# Patient Record
Sex: FEMALE | Race: BLACK OR AFRICAN AMERICAN | Employment: FULL TIME | URBAN - METROPOLITAN AREA
[De-identification: names, ages, dates, MRNs, and addresses within clinical notes are randomized per-mention and may not be internally consistent; named-entity substitution may affect disease eponyms.]

---

## 2019-02-25 LAB
ANTIBODY SCREEN, EXTERNAL: NEGATIVE
CHLAMYDIA, EXTERNAL: NEGATIVE
HBSAG, EXTERNAL: NEGATIVE
HIV, EXTERNAL: NORMAL
N. GONORRHEA, EXTERNAL: NEGATIVE
RPR, EXTERNAL: NORMAL
RUBELLA, EXTERNAL: NORMAL
TYPE, ABO & RH, EXTERNAL: NORMAL

## 2019-08-26 LAB — GRBS, EXTERNAL: NEGATIVE

## 2019-09-17 ENCOUNTER — ANESTHESIA EVENT (OUTPATIENT)
Dept: LABOR AND DELIVERY | Age: 29
DRG: 560 | End: 2019-09-17
Payer: MEDICAID

## 2019-09-17 ENCOUNTER — ANESTHESIA (OUTPATIENT)
Dept: LABOR AND DELIVERY | Age: 29
DRG: 560 | End: 2019-09-17
Payer: MEDICAID

## 2019-09-17 ENCOUNTER — HOSPITAL ENCOUNTER (INPATIENT)
Age: 29
LOS: 2 days | Discharge: HOME | DRG: 560 | End: 2019-09-19
Attending: OBSTETRICS & GYNECOLOGY | Admitting: OBSTETRICS & GYNECOLOGY
Payer: MEDICAID

## 2019-09-17 PROBLEM — Z34.90 PREGNANCY: Status: ACTIVE | Noted: 2019-09-17

## 2019-09-17 LAB
ERYTHROCYTE [DISTWIDTH] IN BLOOD BY AUTOMATED COUNT: 15.3 % (ref 11.5–14.5)
HCT VFR BLD AUTO: 33.3 % (ref 35–47)
HGB BLD-MCNC: 10.7 G/DL (ref 11.5–16)
MCH RBC QN AUTO: 26.2 PG (ref 26–34)
MCHC RBC AUTO-ENTMCNC: 32.1 G/DL (ref 30–36.5)
MCV RBC AUTO: 81.6 FL (ref 80–99)
NRBC # BLD: 0 K/UL (ref 0–0.01)
NRBC BLD-RTO: 0 PER 100 WBC
PLATELET # BLD AUTO: 312 K/UL (ref 150–400)
PMV BLD AUTO: 9.9 FL (ref 8.9–12.9)
RBC # BLD AUTO: 4.08 M/UL (ref 3.8–5.2)
WBC # BLD AUTO: 6.6 K/UL (ref 3.6–11)

## 2019-09-17 PROCEDURE — 74011000250 HC RX REV CODE- 250: Performed by: ANESTHESIOLOGY

## 2019-09-17 PROCEDURE — 36415 COLL VENOUS BLD VENIPUNCTURE: CPT

## 2019-09-17 PROCEDURE — 75410000002 HC LABOR FEE PER 1 HR: Performed by: OBSTETRICS & GYNECOLOGY

## 2019-09-17 PROCEDURE — 3E0R3BZ INTRODUCTION OF ANESTHETIC AGENT INTO SPINAL CANAL, PERCUTANEOUS APPROACH: ICD-10-PCS | Performed by: ANESTHESIOLOGY

## 2019-09-17 PROCEDURE — 3E033VJ INTRODUCTION OF OTHER HORMONE INTO PERIPHERAL VEIN, PERCUTANEOUS APPROACH: ICD-10-PCS | Performed by: OBSTETRICS & GYNECOLOGY

## 2019-09-17 PROCEDURE — 76815 OB US LIMITED FETUS(S): CPT | Performed by: OBSTETRICS & GYNECOLOGY

## 2019-09-17 PROCEDURE — 0HQ9XZZ REPAIR PERINEUM SKIN, EXTERNAL APPROACH: ICD-10-PCS | Performed by: OBSTETRICS & GYNECOLOGY

## 2019-09-17 PROCEDURE — 75410000000 HC DELIVERY VAGINAL/SINGLE: Performed by: OBSTETRICS & GYNECOLOGY

## 2019-09-17 PROCEDURE — 74011250636 HC RX REV CODE- 250/636: Performed by: OBSTETRICS & GYNECOLOGY

## 2019-09-17 PROCEDURE — 4A1HXCZ MONITORING OF PRODUCTS OF CONCEPTION, CARDIAC RATE, EXTERNAL APPROACH: ICD-10-PCS | Performed by: OBSTETRICS & GYNECOLOGY

## 2019-09-17 PROCEDURE — 74011250637 HC RX REV CODE- 250/637: Performed by: OBSTETRICS & GYNECOLOGY

## 2019-09-17 PROCEDURE — 10907ZC DRAINAGE OF AMNIOTIC FLUID, THERAPEUTIC FROM PRODUCTS OF CONCEPTION, VIA NATURAL OR ARTIFICIAL OPENING: ICD-10-PCS | Performed by: OBSTETRICS & GYNECOLOGY

## 2019-09-17 PROCEDURE — 00HU33Z INSERTION OF INFUSION DEVICE INTO SPINAL CANAL, PERCUTANEOUS APPROACH: ICD-10-PCS | Performed by: ANESTHESIOLOGY

## 2019-09-17 PROCEDURE — 77030010848 HC CATH INTUTR PRSS KOLB -B

## 2019-09-17 PROCEDURE — 65270000029 HC RM PRIVATE

## 2019-09-17 PROCEDURE — 75410000003 HC RECOV DEL/VAG/CSECN EA 0.5 HR: Performed by: OBSTETRICS & GYNECOLOGY

## 2019-09-17 PROCEDURE — 85027 COMPLETE CBC AUTOMATED: CPT

## 2019-09-17 PROCEDURE — 76060000078 HC EPIDURAL ANESTHESIA: Performed by: ANESTHESIOLOGY

## 2019-09-17 RX ORDER — SODIUM CHLORIDE 0.9 % (FLUSH) 0.9 %
5-40 SYRINGE (ML) INJECTION EVERY 8 HOURS
Status: DISCONTINUED | OUTPATIENT
Start: 2019-09-17 | End: 2019-09-19 | Stop reason: HOSPADM

## 2019-09-17 RX ORDER — MAG HYDROX/ALUMINUM HYD/SIMETH 200-200-20
30 SUSPENSION, ORAL (FINAL DOSE FORM) ORAL
Status: DISCONTINUED | OUTPATIENT
Start: 2019-09-17 | End: 2019-09-19 | Stop reason: HOSPADM

## 2019-09-17 RX ORDER — NALOXONE HYDROCHLORIDE 0.4 MG/ML
0.4 INJECTION, SOLUTION INTRAMUSCULAR; INTRAVENOUS; SUBCUTANEOUS AS NEEDED
Status: DISCONTINUED | OUTPATIENT
Start: 2019-09-17 | End: 2019-09-19 | Stop reason: HOSPADM

## 2019-09-17 RX ORDER — ONDANSETRON 4 MG/1
4 TABLET, ORALLY DISINTEGRATING ORAL
Status: ACTIVE | OUTPATIENT
Start: 2019-09-17 | End: 2019-09-18

## 2019-09-17 RX ORDER — NALBUPHINE HYDROCHLORIDE 10 MG/ML
10 INJECTION, SOLUTION INTRAMUSCULAR; INTRAVENOUS; SUBCUTANEOUS
Status: DISCONTINUED | OUTPATIENT
Start: 2019-09-17 | End: 2019-09-19 | Stop reason: HOSPADM

## 2019-09-17 RX ORDER — SODIUM CHLORIDE, SODIUM LACTATE, POTASSIUM CHLORIDE, CALCIUM CHLORIDE 600; 310; 30; 20 MG/100ML; MG/100ML; MG/100ML; MG/100ML
125 INJECTION, SOLUTION INTRAVENOUS CONTINUOUS
Status: DISCONTINUED | OUTPATIENT
Start: 2019-09-17 | End: 2019-09-18

## 2019-09-17 RX ORDER — OXYTOCIN/0.9 % SODIUM CHLORIDE 30/500 ML
0-25 PLASTIC BAG, INJECTION (ML) INTRAVENOUS
Status: DISCONTINUED | OUTPATIENT
Start: 2019-09-17 | End: 2019-09-18

## 2019-09-17 RX ORDER — IBUPROFEN 800 MG/1
800 TABLET ORAL EVERY 8 HOURS
Status: DISCONTINUED | OUTPATIENT
Start: 2019-09-17 | End: 2019-09-19 | Stop reason: HOSPADM

## 2019-09-17 RX ORDER — OXYCODONE AND ACETAMINOPHEN 5; 325 MG/1; MG/1
1 TABLET ORAL
Status: DISCONTINUED | OUTPATIENT
Start: 2019-09-17 | End: 2019-09-19 | Stop reason: HOSPADM

## 2019-09-17 RX ORDER — ACETAMINOPHEN 325 MG/1
650 TABLET ORAL
Status: DISCONTINUED | OUTPATIENT
Start: 2019-09-17 | End: 2019-09-19 | Stop reason: HOSPADM

## 2019-09-17 RX ORDER — ZOLPIDEM TARTRATE 5 MG/1
5 TABLET ORAL
Status: DISCONTINUED | OUTPATIENT
Start: 2019-09-17 | End: 2019-09-19 | Stop reason: HOSPADM

## 2019-09-17 RX ORDER — SODIUM CHLORIDE 0.9 % (FLUSH) 0.9 %
5-40 SYRINGE (ML) INJECTION AS NEEDED
Status: DISCONTINUED | OUTPATIENT
Start: 2019-09-17 | End: 2019-09-19 | Stop reason: HOSPADM

## 2019-09-17 RX ORDER — SODIUM CHLORIDE 9 MG/ML
125 INJECTION, SOLUTION INTRAVENOUS CONTINUOUS
Status: DISCONTINUED | OUTPATIENT
Start: 2019-09-17 | End: 2019-09-19 | Stop reason: HOSPADM

## 2019-09-17 RX ORDER — FENTANYL/BUPIVACAINE/NS/PF 2-1250MCG
12 PREFILLED PUMP RESERVOIR EPIDURAL CONTINUOUS
Status: DISCONTINUED | OUTPATIENT
Start: 2019-09-17 | End: 2019-09-18

## 2019-09-17 RX ORDER — BUPIVACAINE HYDROCHLORIDE 5 MG/ML
INJECTION, SOLUTION EPIDURAL; INTRACAUDAL AS NEEDED
Status: DISCONTINUED | OUTPATIENT
Start: 2019-09-17 | End: 2019-09-24 | Stop reason: HOSPADM

## 2019-09-17 RX ORDER — ONDANSETRON 2 MG/ML
4 INJECTION INTRAMUSCULAR; INTRAVENOUS
Status: DISCONTINUED | OUTPATIENT
Start: 2019-09-17 | End: 2019-09-19 | Stop reason: HOSPADM

## 2019-09-17 RX ORDER — OXYTOCIN/RINGER'S LACTATE 20/1000 ML
125-500 PLASTIC BAG, INJECTION (ML) INTRAVENOUS ONCE
Status: DISPENSED | OUTPATIENT
Start: 2019-09-17 | End: 2019-09-18

## 2019-09-17 RX ORDER — HYDROCORTISONE ACETATE PRAMOXINE HCL 2.5; 1 G/100G; G/100G
CREAM TOPICAL AS NEEDED
Status: DISCONTINUED | OUTPATIENT
Start: 2019-09-17 | End: 2019-09-19 | Stop reason: HOSPADM

## 2019-09-17 RX ADMIN — Medication 10 ML/HR: at 13:10

## 2019-09-17 RX ADMIN — SODIUM CHLORIDE, SODIUM LACTATE, POTASSIUM CHLORIDE, AND CALCIUM CHLORIDE 125 ML/HR: 600; 310; 30; 20 INJECTION, SOLUTION INTRAVENOUS at 07:18

## 2019-09-17 RX ADMIN — OXYTOCIN-SODIUM CHLORIDE 0.9% IV SOLN 30 UNIT/500ML 2 MILLI-UNITS/MIN: 30-0.9/5 SOLUTION at 08:50

## 2019-09-17 RX ADMIN — BUPIVACAINE HYDROCHLORIDE 5 ML: 5 INJECTION, SOLUTION EPIDURAL; INTRACAUDAL; PERINEURAL at 15:20

## 2019-09-17 RX ADMIN — SODIUM CHLORIDE 1000 ML: 900 INJECTION, SOLUTION INTRAVENOUS at 14:23

## 2019-09-17 RX ADMIN — SODIUM CHLORIDE 999 ML/HR: 900 INJECTION, SOLUTION INTRAVENOUS at 14:59

## 2019-09-17 RX ADMIN — SODIUM CHLORIDE, SODIUM LACTATE, POTASSIUM CHLORIDE, AND CALCIUM CHLORIDE 999 ML/HR: 600; 310; 30; 20 INJECTION, SOLUTION INTRAVENOUS at 08:45

## 2019-09-17 RX ADMIN — SODIUM CHLORIDE, SODIUM LACTATE, POTASSIUM CHLORIDE, AND CALCIUM CHLORIDE 125 ML/HR: 600; 310; 30; 20 INJECTION, SOLUTION INTRAVENOUS at 09:30

## 2019-09-17 RX ADMIN — BUPIVACAINE HYDROCHLORIDE 8 ML: 2.5 INJECTION, SOLUTION EPIDURAL; INFILTRATION; INTRACAUDAL; PERINEURAL at 15:15

## 2019-09-17 RX ADMIN — LIDOCAINE HYDROCHLORIDE AND EPINEPHRINE 2 ML: 15; 5 INJECTION, SOLUTION EPIDURAL at 12:55

## 2019-09-17 RX ADMIN — BUPIVACAINE HYDROCHLORIDE 8 ML: 2.5 INJECTION, SOLUTION EPIDURAL; INFILTRATION; INTRACAUDAL; PERINEURAL at 18:36

## 2019-09-17 RX ADMIN — IBUPROFEN 800 MG: 800 TABLET ORAL at 21:58

## 2019-09-17 RX ADMIN — SODIUM CHLORIDE 125 ML/HR: 900 INJECTION, SOLUTION INTRAVENOUS at 18:35

## 2019-09-17 RX ADMIN — ACETAMINOPHEN 650 MG: 325 TABLET ORAL at 23:23

## 2019-09-17 RX ADMIN — SODIUM CHLORIDE, SODIUM LACTATE, POTASSIUM CHLORIDE, AND CALCIUM CHLORIDE 125 ML/HR: 600; 310; 30; 20 INJECTION, SOLUTION INTRAVENOUS at 18:35

## 2019-09-17 RX ADMIN — LIDOCAINE HYDROCHLORIDE AND EPINEPHRINE 3 ML: 15; 5 INJECTION, SOLUTION EPIDURAL at 12:50

## 2019-09-17 NOTE — ANESTHESIA PREPROCEDURE EVALUATION
Relevant Problems   No relevant active problems       Anesthetic History   No history of anesthetic complications            Review of Systems / Medical History  Patient summary reviewed and pertinent labs reviewed    Pulmonary                   Neuro/Psych   Within defined limits           Cardiovascular  Within defined limits                Exercise tolerance: >4 METS     GI/Hepatic/Renal     GERD           Endo/Other        Morbid obesity     Other Findings   Comments: , medical IOL for [facility]           Physical Exam    Airway  Mallampati: II  TM Distance: 4 - 6 cm  Neck ROM: normal range of motion   Mouth opening: Normal     Cardiovascular  Regular rate and rhythm,  S1 and S2 normal,  no murmur, click, rub, or gallop  Rhythm: regular  Rate: normal         Dental  No notable dental hx    Comments: Chipped right upper premolar   Pulmonary                 Abdominal         Other Findings            Anesthetic Plan    ASA: 3  Anesthesia type: epidural            Anesthetic plan and risks discussed with: Patient

## 2019-09-17 NOTE — PROGRESS NOTES
arrived to L&D for scheduled induction, pt states she was breech but was vertex yesterday so an induction was scheduled instead of a c/s, denies any issues this pregnancy, placed on monitor & consents signed

## 2019-09-17 NOTE — L&D DELIVERY NOTE
Delivery Summary    Patient: Mohsen Quesada MRN: [id number]  SSN: [id number]    YOB: 1990  Age: 26 y.o. Sex: female        Patient progressed to C/C/+1. Pushed for approximately  10 minutes before delivery of fetal head in OA position. Gentle downward traction of fetal head with easy delivery of shoulders and remainder of body. Nuchal cord reduced. Infant placed on mothers abdomen. Placenta delivered intact in 10 minutes. Perineum inspected and noted 1st degree laceration that was repaired with 3-0 vircyl in a figure of eight. Information for the patient's :  Meghan Marti [[id number]]       Labor Events:    Labor: No    Steroids: None   Cervical Ripening Date/Time:       Cervical Ripening Type: None   Antibiotics During Labor: No   Rupture Identifier:      Rupture Date/Time: 2019 8:36 AM   Rupture Type: AROM   Amniotic Fluid Volume: Moderate    Amniotic Fluid Description: Clear    Amniotic Fluid Odor: None    Induction: Oxytocin;AROM       Induction Date/Time:        Indications for Induction: Hypertension    Augmentation: Oxytocin;AROM   Augmentation Date/Time:      Indications for Augmentation:     Labor complications:          Additional complications:        Delivery Events:  Indications For Episiotomy:     Episiotomy:     Perineal Laceration(s):     Repaired:     Periurethral Laceration Location:      Repaired:     Labial Laceration Location:     Repaired:     Sulcal Laceration Location:     Repaired:     Vaginal Laceration Location:     Repaired:     Cervical Laceration Location:     Repaired:     Repair Suture:     Number of Repair Packets:     Estimated Blood Loss (ml):  ml     Delivery Date: 2019    Delivery Time: 7:03 PM  Delivery Type: Vaginal, Spontaneous  Sex:  Female    Gestational Age: 36w3d   Delivery Clinician:  Aura Harper  Living Status: Living   Delivery Location: L&D            APGARS  One minute Five minutes Ten minutes Skin color: 1   1        Heart rate: 2   2        Grimace: 2   2        Muscle tone: 2   2        Breathin   2        Totals: 9   9            Presentation: Vertex    Position:        Resuscitation Method:  Suctioning-bulb; Tactile Stimulation     Meconium Stained: None      Cord Information: 3 Vessels  Complications: None  Cord around:    Delayed cord clamping? Yes  Cord clamped date/time:2019  7:04 PM  Disposition of Cord Blood: Lab    Blood Gases Sent?: No    Placenta:  Date/Time:    Removal:        Appearance:        Measurements:  Birth Weight:        Birth Length:        Head Circumference:        Chest Circumference:       Abdominal Girth: Other Providers:   Rony HERRERA;MICHAEL MOON;;;;;;;;ANMOL YEAGER;Zurdo SCHERER, Obstetrician;Primary Nurse;Primary [place] Nurse;Nicu Nurse;Neonatologist;Anesthesiologist;Crna;Nurse Practitioner;Midwife;Nursery Nurse;           Group B Strep:   Lab Results   Component Value Date/Time    GrBStrep, External Negative 2019     Information for the patient's :  Jorge Short [[id number]]   No results found for: ABORH, PCTABR, PCTDIG, BILI, ABORHEXT, ABORH    No results for input(s): PCO2CB, PO2CB, HCO3I, SO2I, IBD, PTEMPI, SPECTI, PHICB, ISITE, IDEV, IALLEN in the last 72 hours.

## 2019-09-17 NOTE — H&P
History and Physical    Patient: Himanshu Pereira MRN: [id number]  SSN: [id number]    YOB: 1990  Age: 26 y.o. Sex: female      Subjective:      Himanshu Pereira is a 26 y.o.  at 36.2 admitted for induction of labor for chronic hypertension. Patient is doing well this am and denies any contractions, loss of fluid or vaginal bleeding. Active fetal movement. Pregnancy Hx:  CHTN- no meds currently   Morbid obesity  LGA: EFW 98%tile, ~4400g on    O+/ GBS negative     History reviewed. No pertinent past medical history. History reviewed. No pertinent surgical history. History reviewed. No pertinent family history. Social History     Tobacco Use    Smoking status: Never Smoker    Smokeless tobacco: Never Used   Substance Use Topics    Alcohol use: No      Prior to Admission medications    Medication Sig Start Date End Date Taking? Authorizing Provider   [id number] araseli-folic-dha (PRENATAL DHA+COMPLETE PRENATAL) [id number] mg-mcg-mg cmpk Take 1 Cap by mouth. Indications: pregnancy   Yes Provider, Historical        No Known Allergies    Review of Systems:  A comprehensive review of systems was negative except for that written in the History of Present Illness.     Objective:     Vitals:    19 0627 19 0700   BP:  144/89   Pulse:  (!) 102   Resp:  14   Temp:  98.3 °F (36.8 °C)   Weight: 145.2 kg (320 lb)    Height: 5' 6\" (1.676 m)         Physical Exam:  Gen: alert and oriented X3   Resp:nonlabored respirations  Cardiac: normal peripheral perfusion  Abdomen: Gravid   SVE: 3/50/-3 , AROM performed   Vertex on US    130's/mod/+ accels/ no decels  No contractions     Recent Results (from the past 12 hour(s))   CBC W/O DIFF    Collection Time: 19  6:39 AM   Result Value Ref Range    WBC 6.6 3.6 - 11.0 K/uL    RBC 4.08 3.80 - 5.20 M/uL    HGB 10.7 (L) 11.5 - 16.0 g/dL    HCT 33.3 (L) 35.0 - 47.0 %    MCV 81.6 80.0 - 99.0 FL    MCH 26.2 26.0 - 34.0 PG    MCHC 32.1 30.0 - 36.5 g/dL    RDW 15.3 (H) 11.5 - 14.5 %    PLATELET 570 559 - 629 K/uL    MPV 9.9 8.9 - 12.9 FL    NRBC 0.0 0  WBC    ABSOLUTE NRBC 0.00 0.00 - 0.01 K/uL         Hospital Problems  Never Reviewed          Codes Class Noted POA    Pregnancy ICD-10-CM: Z34.90  ICD-9-CM: V22.2  2019 Unknown              Plan:   27 yo  at 39.1 admitted for IOL for [facility] currently off medication ( briefly on earlier in pregnancy).    Start IOL with pitocin and AROM  LGA fetus: ~4400g yesterday on US, EFW 98%tile    O+/GBS neg         Signed By: Clarita Knight MD     2019

## 2019-09-17 NOTE — PROGRESS NOTES
0700-Bedside report received from Alexandre Jensen RN. POC discussed with pt at this time, she verbalized understanding and has no questions or concerns at this time. 0833-Tarsha Barrios at bedside to see pt, discuss POC, perform ultrasound, and SVE. 0834-Ultrasound performed, infant still vertex at this time. 0836-SVE performed, cervix is 3/50/-3, pt AROM'd and clear fluid noted. 1313-Dr. Haskins at bedside to perform SVE, and place internal monitors. 1411-Dr. Haskins called and report pt still having variable decelerations after changing positions. New order received to start amnio infusion. 1423-Amnio Infusion started at this time. 1444-Keo Gregory called and requested re-dose for pt at this time. MD in OR and will come evaluate pt when she is out. 1511-Dr. Jenae Villalobos at bedside to re-dose epidural.  1811-Pt repositioned to left lateral.  1819-Infant having decelerations after repositioning pt. Pt now repositioned back to right lateral with birthing ball at this time. 1826-Dr. Lesli Caban called and asked to re-dose pt. 1834-Bassam Monroe at bedside to re-dose pt.  1841-Dr. Haskins at bedside to recheck pt, pt 10cm and to be prepared for delivery. 1845-Grimes catheter removed, 550cc of urine noted. 1900-Bedside report given to SIMI Trujillo RN.

## 2019-09-17 NOTE — PROGRESS NOTES
- Received report from ALTAGRACIA Painter RN. 1903  of [facility] by MD Haskins    2130 Pt resting comfortably in bed, no complaints. Infant, partner, and mother at bedside. Delivery  mL, recovery  mL    2245 Pt  Ambulated to bathroom, voided 400 ml. Tolerated well.     1106 Report given to [facility]

## 2019-09-17 NOTE — PROGRESS NOTES
Patient resting comfortably with epidural.     Visit Vitals  /89   Pulse (!) 102   Temp 98.3 °F (36.8 °C)   Resp 14   Ht 5' 6\" (1.676 m)   Wt 145.2 kg (320 lb)   BMI 51.65 kg/m²     Gen: alert and oriented X3   Resp:nonlabored respirations  Cardiac: normal peripheral perfusion  Abdomen: Gravid   SVE: 4/70/-3    135/mod/ + accel/ variable decels ( resolve with position changes)     25 yo  at 39.1 admitted for IOL for [facility] currently off medication ( briefly on earlier in pregnancy). AROM this am with clear fluid    Pitocin on 10U, IUPC placed to aid in titration.  Contractions difficult to trace due to habitus   FSE placed for fetal heart rate monitoring    Repeat exam in 2- 4 hrs or PRN as indicated     LGA fetus: ~4400g yesterday on US, EFW 98%tile    O+/GBS neg     Zarina Sam M.D.

## 2019-09-17 NOTE — PROGRESS NOTES
Resting comfortably with epidural.     Visit Vitals  /89   Pulse (!) 102   Temp 98.3 °F (36.8 °C)   Resp 14   Ht 5' 6\" (1.676 m)   Wt 145.2 kg (320 lb)   BMI 51.65 kg/m²        Gen: alert and oriented X3   Resp:nonlabored respirations  Cardiac: normal peripheral perfusion  Abdomen: Gravid   SVE: /-3     27 yo  at 39.1 admitted for IOL for [address] [facility] [address] currently off medication ( briefly on earlier in pregnancy). AROM this am with clear fluid    Continue Pitocin, IUPC placed at 1300 to titrate and monitor contractions.  Adequate since approximately 1445   has FSE  for fetal heart rate monitoring    Repeat exam in 2- 4 hrs or PRN as indicated      LGA fetus: ~4400g yesterday on US, EFW 98%tile    Maternal BMI : 51   O+/GBS neg      Zarina Sam M.D.

## 2019-09-18 LAB
ALBUMIN SERPL-MCNC: 2.1 G/DL (ref 3.5–5)
ALBUMIN/GLOB SERPL: 0.7 {RATIO} (ref 1.1–2.2)
ALP SERPL-CCNC: 79 U/L (ref 45–117)
ALT SERPL-CCNC: 15 U/L (ref 12–78)
ANION GAP SERPL CALC-SCNC: 5 MMOL/L (ref 5–15)
AST SERPL-CCNC: 18 U/L (ref 15–37)
BILIRUB SERPL-MCNC: 0.3 MG/DL (ref 0.2–1)
BUN SERPL-MCNC: 8 MG/DL (ref 6–20)
BUN/CREAT SERPL: 17 (ref 12–20)
CALCIUM SERPL-MCNC: 8.4 MG/DL (ref 8.5–10.1)
CHLORIDE SERPL-SCNC: 110 MMOL/L (ref 97–108)
CO2 SERPL-SCNC: 23 MMOL/L (ref 21–32)
CREAT SERPL-MCNC: 0.48 MG/DL (ref 0.55–1.02)
CREAT UR-MCNC: 100 MG/DL
ERYTHROCYTE [DISTWIDTH] IN BLOOD BY AUTOMATED COUNT: 15.4 % (ref 11.5–14.5)
GLOBULIN SER CALC-MCNC: 3.2 G/DL (ref 2–4)
GLUCOSE SERPL-MCNC: 71 MG/DL (ref 65–100)
HCT VFR BLD AUTO: 30.6 % (ref 35–47)
HGB BLD-MCNC: 9.9 G/DL (ref 11.5–16)
MCH RBC QN AUTO: 26.5 PG (ref 26–34)
MCHC RBC AUTO-ENTMCNC: 32.4 G/DL (ref 30–36.5)
MCV RBC AUTO: 81.8 FL (ref 80–99)
NRBC # BLD: 0 K/UL (ref 0–0.01)
NRBC BLD-RTO: 0 PER 100 WBC
PLATELET # BLD AUTO: 284 K/UL (ref 150–400)
PMV BLD AUTO: 10.3 FL (ref 8.9–12.9)
POTASSIUM SERPL-SCNC: 4.2 MMOL/L (ref 3.5–5.1)
PROT SERPL-MCNC: 5.3 G/DL (ref 6.4–8.2)
PROT UR-MCNC: 36 MG/DL (ref 0–11.9)
PROT/CREAT UR-RTO: 0.4
RBC # BLD AUTO: 3.74 M/UL (ref 3.8–5.2)
SODIUM SERPL-SCNC: 138 MMOL/L (ref 136–145)
WBC # BLD AUTO: 10.4 K/UL (ref 3.6–11)

## 2019-09-18 PROCEDURE — 84156 ASSAY OF PROTEIN URINE: CPT

## 2019-09-18 PROCEDURE — 85027 COMPLETE CBC AUTOMATED: CPT

## 2019-09-18 PROCEDURE — 36415 COLL VENOUS BLD VENIPUNCTURE: CPT

## 2019-09-18 PROCEDURE — 65270000029 HC RM PRIVATE

## 2019-09-18 PROCEDURE — 74011250637 HC RX REV CODE- 250/637: Performed by: OBSTETRICS & GYNECOLOGY

## 2019-09-18 PROCEDURE — 77030021125

## 2019-09-18 PROCEDURE — 80053 COMPREHEN METABOLIC PANEL: CPT

## 2019-09-18 RX ORDER — NIFEDIPINE 30 MG/1
30 TABLET, EXTENDED RELEASE ORAL DAILY
Status: CANCELLED | OUTPATIENT
Start: 2019-09-18

## 2019-09-18 RX ORDER — NIFEDIPINE 30 MG/1
30 TABLET, EXTENDED RELEASE ORAL DAILY
Status: DISCONTINUED | OUTPATIENT
Start: 2019-09-18 | End: 2019-09-19 | Stop reason: HOSPADM

## 2019-09-18 RX ADMIN — NIFEDIPINE 30 MG: 30 TABLET, FILM COATED, EXTENDED RELEASE ORAL at 21:00

## 2019-09-18 RX ADMIN — ACETAMINOPHEN 650 MG: 325 TABLET ORAL at 20:08

## 2019-09-18 RX ADMIN — IBUPROFEN 800 MG: 800 TABLET ORAL at 17:03

## 2019-09-18 RX ADMIN — ACETAMINOPHEN 650 MG: 325 TABLET ORAL at 04:35

## 2019-09-18 RX ADMIN — IBUPROFEN 800 MG: 800 TABLET ORAL at 08:15

## 2019-09-18 NOTE — PROGRESS NOTES
12 SBAR report from Payton Wilhelm, RN    4431 Morning assessment completed. SO at bedside. No needs expressed at this time. 1023 Patient sitting  on side of bed, eating breakfast. SO at bedside. No needs expressed at this time. 1215 Patient sitting in bed, holding baby. No needs expressed at this time. 1440 Patient walking in room. New bed pad provided. No other needs provided at this time. 1550 Patient in bed, sleeping. SO at bedside. 1710 Elevated BP. Patient reporting she needs to void and is in pain. Denies h/a, vision changed. Pain medicine given. Will re-check BP after patient voids. 36 Dr. Niya Bird notified of elevated BP. Per MD, re-check BP and call MD if still elevated. [address] [facility] [address]. Niya Bird updated on BP. No new orders a this time. Will continue to monitor on L&D.     1840 Patient resting in bed. Family at bedside. No needs expressed at this time. 0 SBAR report given to TUAN Ling Left message for patient to please call our office back.

## 2019-09-18 NOTE — PROGRESS NOTES
Bps elevated above baseline. Diagnosed with CHTN with elevated Bps <20weeks. Baseline labs wnl with Pr/Cr 0.2. Tried labetalol low dose for a few days but felt poorly. Intrapartum with rise in baseline. Today with 130-150s and few 589F systolic. [address] [facility] labs sent. If persistently >150s, will initiate nifedipine 30mg XR. Patient prefers to not take labetalol again.      Saud Wise MD

## 2019-09-18 NOTE — LACTATION NOTE
1410 - Rounding for lactation needs. Pt requests to defer consult until later in the afternoon. Pt encouraged to call [facility] when ready.

## 2019-09-18 NOTE — PROGRESS NOTES
Post-Partum Day Number 1 Progress Note    Sandy Argueta     Assessment: Doing well, post partum day 1, CHTN no medications    Plan:  1. Continue routine postpartum and perineal care as well as maternal education. 2. CHTN no medications- monitor BPs    Information for the patient's :  Yamileth Waters [[id number]]   Vaginal, Spontaneous   Patient doing well without significant complaint. Voiding without difficulty, normal lochia. Vitals:  Visit Vitals  /88 (BP 1 Location: Right arm, BP Patient Position: At rest;Sitting)   Pulse 94   Temp 98.1 °F (36.7 °C)   Resp 16   Ht 5' 6\" (1.676 m)   Wt 145.2 kg (320 lb)   SpO2 98%   Breastfeeding? Unknown   BMI 51.65 kg/m²     Temp (24hrs), Av.5 °F (36.9 °C), Min:98 °F (36.7 °C), Max:99.9 °F (37.7 °C)        Exam:   Patient without distress. Abdomen soft, fundus firm, nontender                Perineum with normal lochia noted. Lower extremities are negative for swelling, cords or tenderness. Labs:     Lab Results   Component Value Date/Time    WBC 6.6 2019 06:39 AM    WBC 3.4 (L) 2014 08:30 AM    HGB 10.7 (L) 2019 06:39 AM    HGB 12.1 2014 08:30 AM    HCT 33.3 (L) 2019 06:39 AM    HCT 36.1 2014 08:30 AM    PLATELET 123  06:39 AM    PLATELET 169  08:30 AM       No results found for this or any previous visit (from the past 24 hour(s)).

## 2019-09-19 VITALS
TEMPERATURE: 98.2 F | RESPIRATION RATE: 20 BRPM | BODY MASS INDEX: 47.09 KG/M2 | SYSTOLIC BLOOD PRESSURE: 147 MMHG | WEIGHT: 293 LBS | OXYGEN SATURATION: 98 % | HEIGHT: 66 IN | DIASTOLIC BLOOD PRESSURE: 93 MMHG | HEART RATE: 94 BPM

## 2019-09-19 PROCEDURE — 74011250637 HC RX REV CODE- 250/637: Performed by: OBSTETRICS & GYNECOLOGY

## 2019-09-19 RX ORDER — NIFEDIPINE 30 MG/1
30 TABLET, EXTENDED RELEASE ORAL DAILY
Qty: 30 TAB | Refills: 1 | Status: SHIPPED | OUTPATIENT
Start: 2019-09-20 | End: 2022-08-27

## 2019-09-19 RX ORDER — IBUPROFEN 800 MG/1
800 TABLET ORAL EVERY 8 HOURS
Qty: 30 TAB | Refills: 1 | Status: SHIPPED | OUTPATIENT
Start: 2019-09-19 | End: 2022-08-27

## 2019-09-19 RX ORDER — NIFEDIPINE 30 MG/1
30 TABLET, EXTENDED RELEASE ORAL
Status: DISCONTINUED | OUTPATIENT
Start: 2019-09-19 | End: 2019-09-19 | Stop reason: HOSPADM

## 2019-09-19 RX ADMIN — IBUPROFEN 800 MG: 800 TABLET ORAL at 10:23

## 2019-09-19 RX ADMIN — IBUPROFEN 800 MG: 800 TABLET ORAL at 02:44

## 2019-09-19 RX ADMIN — ACETAMINOPHEN 650 MG: 325 TABLET ORAL at 05:27

## 2019-09-19 RX ADMIN — NIFEDIPINE 30 MG: 30 TABLET, FILM COATED, EXTENDED RELEASE ORAL at 09:22

## 2019-09-19 NOTE — PROGRESS NOTES
Dr Givens Marrow on unit to see patient. New verbal order given for procardia xl 30mg po dose now. See md orders.

## 2019-09-19 NOTE — PROGRESS NOTES
Patient discharged to home with infant and significant other. Infant in carseat. Patient in wheelchair. Prescriptions given x 2 (procardia, ibuprofen). Discharge instructions reviewed with patient and significant other, signed by patient, and copies given. Per patient and significant other, no questions. Patient and infant bands verified, see infant note and/or footprint sheet on chart.

## 2019-09-19 NOTE — PROGRESS NOTES
2012: BP elevated. Dr Sandra Fernandez notified. Verbal order given to give procardia xr and reevaluate blood pressure 30 minutes after dose given. Will continue to monitor.

## 2019-09-19 NOTE — PROGRESS NOTES
Blood pressures taken at 1220 noted to 150/94, pulse 94 and at 1320 noted to be 147/93, pulse 94. Results called to Dr Ashutosh Gomez. Per Dr Ashutosh Gomez, discharge patient to home and have patient follow up on Monday or Tuesday of next week for blood pressure check.

## 2019-09-19 NOTE — LACTATION NOTE
This note was copied from a baby's chart. Mother and baby for discharge today. Mother states baby has been latching on well and breastfeeding well. She is an experienced breastfeeding mother. Reviewed breastfeeding basics:  Supply and demand, breastfeed baby 8-12 times in 24 hr., feed baby on demand,   stomach size, early  Feeding cues, skin to skin, positioning and baby led latch-on, assymetrical latch with signs of good, deep latch vs shallow, feeding frequency and duration, and log sheet for tracking infant feedings and output. Breastfeeding Booklet and Warm line information given. Discussed typical  weight loss and the importance of infant weight checks with pediatrician 1-2 post discharge. Engorgement Care Guidelines:  Reviewed how milk is made and normal phases of milk production. Taught care of engorged breasts - frequent breastfeeding encouraged, cool packs and motrin as tolerated. Anticipatory guidance shared. Care for sore/tender nipples discussed:  ways to improve positioning and latch practiced and discussed, hand express colostrum after feedings and let air dry, light application of lanolin, hydrogel pads, seek comfortable laid back feeding position, start feedings on least sore side first.    Discussed eating a healthy diet. Instructed mother to eat a variety of foods in order to get a well balanced diet. She should consume an extra 500 calories per day (more than her non-pregnant requirement.) These extra calories will help provide energy needed for optimal breast milk production. Mother also encouraged to \"drink to thirst\" and it is recommended that she drink fluids such as water, fruit/vegetable juice. Nutritious snacks should be available so that she can eat throughout the day to help satisfy her hunger and maintain a good milk supply.     Pt will successfully establish breastfeeding by feeding in response to early feeding cues   or wake every 3h, will obtain deep latch, and will keep log of feedings/output. Taught to BF at hunger cues and or q 2-3 hrs and to offer 10-20 drops of hand expressed colostrum at any non-feeds. Breast Assessment  Left Breast: Large, Extra large  Left Nipple: Everted, Intact, Short  Right Breast: Large, Extra large  Right Nipple: Everted, Intact, Short  Breast- Feeding Assessment  Attends Breast-Feeding Classes: No  Breast-Feeding Experience: Yes(Breast fed 1st baby x 9 months and pumped one additional month)  Breast Trauma/Surgery: No  Type/Quality: Good  Lactation Consultant Visits  Breast-Feedings: Good   Mother/Infant Observation  Mother Observation: Alignment, Recognizes feeding cues, Breast comfortable, Holds breast, Close hold, Lets baby end feeding, Cramps  Infant Observation: Audible swallows, Lips flanged, lower, Lips flanged, upper, Feeding cues, Opens mouth, Latches nipple and aereolae, Rhythmic suck  LATCH Documentation  Latch: Grasps breast, tongue down, lips flanged, rhythmic sucking  Audible Swallowing: A few with stimulation  Type of Nipple: Everted (after stimulation)  Comfort (Breast/Nipple): Soft/non-tender  Hold (Positioning): No assist from staff, mother able to position/hold infant  LATCH Score: 9      Chart shows numerous feedings, void, stool WNL. Discussed importance of monitoring outputs and feedings on first week of life. Discussed ways to tell if baby is  getting enough breast milk, ie  voids and stools, change in color of stool, and return to birth wt within 2 weeks. Follow up with pediatrician visit for weight check in 1-2 days (per AAP guidelines.)  Encouraged to call Warm Line  [phone]  for any questions/problems that arise.  Mother also given breastfeeding support group dates and times for any future needs

## 2019-09-19 NOTE — ROUTINE PROCESS
Bedside and Verbal shift change report given to HELLEN Holcomb RN (oncoming nurse) by [employer]. Vivian Garcia RN (offgoing nurse). Report included the following information SBAR, Kardex, Intake/Output, MAR, Accordion and Recent Results.

## 2019-09-19 NOTE — PROGRESS NOTES
Post-Partum Day Number 2 Progress Note    [facility]     Assessment: Doing well, post partum day 2    Plan:   1. Discharge home today  2. Follow up in office in 1 week for BP check and in 6 weeks with Buzz Cruz MD  3. Post partum activity advised, diet as tolerated  4. Discharge Medications: ibuprofen, nifedipine and medications prior to admission    Information for the patient's :  Talha Sherman [[id number]]   Vaginal, Spontaneous   Patient doing well without significant complaint. Voiding without difficulty, normal lochia. Vitals:  Visit Vitals  /85 (BP 1 Location: Right arm, BP Patient Position: At rest)   Pulse 85   Temp 98.2 °F (36.8 °C)   Resp 20   Ht 5' 6\" (1.676 m)   Wt 145.2 kg (320 lb)   SpO2 98%   Breastfeeding? Unknown   BMI 51.65 kg/m²     Temp (24hrs), Av.1 °F (36.7 °C), Min:97.9 °F (36.6 °C), Max:98.2 °F (36.8 °C)      Exam:         Patient without distress. Abdomen soft, fundus firm, nontender                 Lower extremities are negative for swelling, cords or tenderness.     Labs:     Lab Results   Component Value Date/Time    WBC 10.4 2019 07:47 PM    WBC 6.6 2019 06:39 AM    WBC 3.4 (L) 2014 08:30 AM    HGB 9.9 (L) 2019 07:47 PM    HGB 10.7 (L) 2019 06:39 AM    HGB 12.1 2014 08:30 AM    HCT 30.6 (L) 2019 07:47 PM    HCT 33.3 (L) 2019 06:39 AM    HCT 36.1 2014 08:30 AM    PLATELET 528  07:47 PM    PLATELET 781  06:39 AM    PLATELET 025  08:30 AM       Recent Results (from the past 24 hour(s))   CBC W/O DIFF    Collection Time: 19  7:47 PM   Result Value Ref Range    WBC 10.4 3.6 - 11.0 K/uL    RBC 3.74 (L) 3.80 - 5.20 M/uL    HGB 9.9 (L) 11.5 - 16.0 g/dL    HCT 30.6 (L) 35.0 - 47.0 %    MCV 81.8 80.0 - 99.0 FL    MCH 26.5 26.0 - 34.0 PG    MCHC 32.4 30.0 - 36.5 g/dL    RDW 15.4 (H) 11.5 - 14.5 %    PLATELET 085 804 - 693 K/uL    MPV 10.3 8.9 - 12.9 FL NRBC 0.0 0  WBC    ABSOLUTE NRBC 0.00 0.00 - 0.24 K/uL   METABOLIC PANEL, COMPREHENSIVE    Collection Time: 09/18/19  7:47 PM   Result Value Ref Range    Sodium 138 136 - 145 mmol/L    Potassium 4.2 3.5 - 5.1 mmol/L    Chloride 110 (H) 97 - 108 mmol/L    CO2 23 21 - 32 mmol/L    Anion gap 5 5 - 15 mmol/L    Glucose 71 65 - 100 mg/dL    BUN 8 6 - 20 MG/DL    Creatinine 0.48 (L) 0.55 - 1.02 MG/DL    BUN/Creatinine ratio 17 12 - 20      GFR est AA >60 >60 ml/min/1.73m2    GFR est non-AA >60 >60 ml/min/1.73m2    Calcium 8.4 (L) 8.5 - 10.1 MG/DL    Bilirubin, total 0.3 0.2 - 1.0 MG/DL    ALT (SGPT) 15 12 - 78 U/L    AST (SGOT) 18 15 - 37 U/L    Alk. phosphatase 79 45 - 117 U/L    Protein, total 5.3 (L) 6.4 - 8.2 g/dL    Albumin 2.1 (L) 3.5 - 5.0 g/dL    Globulin 3.2 2.0 - 4.0 g/dL    A-G Ratio 0.7 (L) 1.1 - 2.2     PROTEIN/CREATININE RATIO, URINE    Collection Time: 09/18/19  8:28 PM   Result Value Ref Range    Protein, urine random 36 (H) 0.0 - 11.9 mg/dL    Creatinine, urine 100.00 mg/dL    Protein/Creat.  urine Ratio 0.4

## 2019-09-19 NOTE — DISCHARGE SUMMARY
Obstetrical Discharge Summary     Name: Domenic Jones MRN: [id number]  SSN: [id number]    YOB: 1990  Age: 26 y.o. Sex: female      Admit Date: 2019    Discharge Date: 2019     Admitting Physician: Rosalia Alvarez MD     Attending Physician:  Mason Wong MD     Admission Diagnoses: Pregnancy [Z34.90]    Discharge Diagnoses:   Information for the patient's :  Avi Arch [[id number]]   Delivery of a 3.94 kg female infant via Vaginal, Spontaneous on 2019 at 7:03 PM  by Rosalia Alvarez. Apgars were 9  and 9 . Additional Diagnoses:   Hospital Problems  Never Reviewed          Codes Class Noted POA    Pregnancy ICD-10-CM: Z34.90  ICD-9-CM: V22.2  2019 Unknown             Lab Results   Component Value Date/Time    Rubella, External Immune 2019    GrBStrep, External Negative 2019       Hospital Course: Postpartum course was complicated by hypertension, which responded well to oral nifedipine XR (patient had dizziness with labetalol.)      Disposition at Discharge: Home or self care    Discharged Condition: Stable    Patient Instructions:   Current Discharge Medication List      START taking these medications    Details   ibuprofen (MOTRIN) 800 mg tablet Take 1 Tab by mouth every eight (8) hours. Qty: 30 Tab, Refills: 1      NIFEdipine ER (PROCARDIA XL) 30 mg ER tablet Take 1 Tab by mouth daily. Qty: 30 Tab, Refills: 1         CONTINUE these medications which have NOT CHANGED    Details   [id number] araseli-folic-dha (PRENATAL DHA+COMPLETE PRENATAL) -300 mg-mcg-mg cmpk Take 1 Cap by mouth. Indications: pregnancy             Reference my discharge instructions. Follow-up Appointments   Procedures    FOLLOW UP VISIT Appointment in: One Week F/U in 1 week for BP check     F/U in 1 week for BP check     Standing Status:   Standing     Number of Occurrences:   1     Order Specific Question:   Appointment in     Answer:    One Week  FOLLOW UP VISIT Appointment in: 6 Weeks Post partum follow up with OB provider in 6 weeks. Post partum follow up with OB provider in 6 weeks.      Standing Status:   Standing     Number of Occurrences:   1     Standing Expiration Date:   9/20/2019     Order Specific Question:   Appointment in     Answer:   6 Weeks        Signed By:  Melba Dyson MD     September 19, 2019

## 2019-09-24 RX ORDER — BUPIVACAINE HYDROCHLORIDE 2.5 MG/ML
INJECTION, SOLUTION EPIDURAL; INFILTRATION; INTRACAUDAL AS NEEDED
Status: DISCONTINUED | OUTPATIENT
Start: 2019-09-17 | End: 2019-09-24 | Stop reason: HOSPADM

## 2019-09-24 RX ORDER — LIDOCAINE HYDROCHLORIDE AND EPINEPHRINE 15; 5 MG/ML; UG/ML
INJECTION, SOLUTION EPIDURAL AS NEEDED
Status: DISCONTINUED | OUTPATIENT
Start: 2019-09-17 | End: 2019-09-24 | Stop reason: HOSPADM

## 2019-09-24 NOTE — ANESTHESIA PROCEDURE NOTES
Epidural Block    Start time: 9/17/2019 12:46 PM  End time: 9/17/2019 1:00 PM  Performed by: Arianne Mckeon MD  Authorized by: Arianne Mckeon MD     Pre-Procedure  Indication: labor epidural    Preanesthetic Checklist: patient identified, risks and benefits discussed, anesthesia consent, patient being monitored, timeout performed and anesthesia consent    Timeout Time: 12:45        Epidural:   Patient position:  Seated  Prep region:  Lumbar  Prep: Betadine    Location:  L3-4    Needle and Epidural Catheter:   Needle Type:  Tuohy  Needle Gauge:  17 G  Injection Technique:  Loss of resistance using air  Attempts:  1  Catheter Size:  20 G  Catheter at Skin Depth (cm):  7  Depth in Epidural Space (cm):  4  Events: no blood with aspiration, no cerebrospinal fluid with aspiration, no paresthesia and negative aspiration test    Test Dose:  Negative    Assessment:   Catheter Secured:  Tegaderm and tape  Insertion:  Uncomplicated  Patient tolerance:  Patient tolerated the procedure well with no immediate complications  Late entry. Dr. Gladis Samuels called to STAT C/S while preparing to complete chart. Information added by Juan Johnson D.O. after conversation with Dr. Gladis Samuels.

## 2022-03-19 PROBLEM — Z34.90 PREGNANCY: Status: ACTIVE | Noted: 2019-09-17

## 2022-03-31 LAB
ANTIBODY SCREEN, EXTERNAL: NEGATIVE
CHLAMYDIA, EXTERNAL: NEGATIVE
HBSAG, EXTERNAL: NEGATIVE
HIV, EXTERNAL: NORMAL
N. GONORRHEA, EXTERNAL: NEGATIVE
RUBELLA, EXTERNAL: NORMAL
T. PALLIDUM, EXTERNAL: NORMAL

## 2022-06-30 LAB — ANTIBODY SCREEN, EXTERNAL: NEGATIVE

## 2022-08-17 LAB — GRBS, EXTERNAL: NEGATIVE

## 2022-08-24 ENCOUNTER — HOSPITAL ENCOUNTER (INPATIENT)
Age: 32
LOS: 3 days | Discharge: HOME | DRG: 560 | End: 2022-08-27
Attending: OBSTETRICS & GYNECOLOGY | Admitting: OBSTETRICS & GYNECOLOGY
Payer: MEDICAID

## 2022-08-24 PROBLEM — Z3A.37 37 WEEKS GESTATION OF PREGNANCY: Status: ACTIVE | Noted: 2022-08-24

## 2022-08-24 PROBLEM — Z34.90 TERM PREGNANCY: Status: ACTIVE | Noted: 2022-08-24

## 2022-08-24 PROBLEM — O13.9 GESTATIONAL HYPERTENSION: Status: ACTIVE | Noted: 2022-08-24

## 2022-08-24 LAB
ALBUMIN SERPL-MCNC: 2.3 G/DL (ref 3.5–5)
ALBUMIN/GLOB SERPL: 0.6 {RATIO} (ref 1.1–2.2)
ALP SERPL-CCNC: 71 U/L (ref 45–117)
ALT SERPL-CCNC: 14 U/L (ref 12–78)
ANION GAP SERPL CALC-SCNC: 11 MMOL/L (ref 5–15)
AST SERPL-CCNC: 21 U/L (ref 15–37)
BILIRUB SERPL-MCNC: 0.3 MG/DL (ref 0.2–1)
BUN SERPL-MCNC: 8 MG/DL (ref 6–20)
BUN/CREAT SERPL: 17 (ref 12–20)
CALCIUM SERPL-MCNC: 8.3 MG/DL (ref 8.5–10.1)
CHLORIDE SERPL-SCNC: 108 MMOL/L (ref 97–108)
CO2 SERPL-SCNC: 21 MMOL/L (ref 21–32)
CREAT SERPL-MCNC: 0.48 MG/DL (ref 0.55–1.02)
CREAT UR-MCNC: 144 MG/DL
ERYTHROCYTE [DISTWIDTH] IN BLOOD BY AUTOMATED COUNT: 15.9 % (ref 11.5–14.5)
GLOBULIN SER CALC-MCNC: 3.7 G/DL (ref 2–4)
GLUCOSE SERPL-MCNC: 97 MG/DL (ref 65–100)
HCT VFR BLD AUTO: 31.5 % (ref 35–47)
HGB BLD-MCNC: 10.2 G/DL (ref 11.5–16)
MCH RBC QN AUTO: 26.4 PG (ref 26–34)
MCHC RBC AUTO-ENTMCNC: 32.4 G/DL (ref 30–36.5)
MCV RBC AUTO: 81.6 FL (ref 80–99)
NRBC # BLD: 0 K/UL (ref 0–0.01)
NRBC BLD-RTO: 0 PER 100 WBC
PLATELET # BLD AUTO: 363 K/UL (ref 150–400)
PMV BLD AUTO: 10.5 FL (ref 8.9–12.9)
POTASSIUM SERPL-SCNC: 4.3 MMOL/L (ref 3.5–5.1)
PROT SERPL-MCNC: 6 G/DL (ref 6.4–8.2)
PROT UR-MCNC: 15 MG/DL (ref 0–11.9)
PROT/CREAT UR-RTO: 0.1
RBC # BLD AUTO: 3.86 M/UL (ref 3.8–5.2)
SODIUM SERPL-SCNC: 140 MMOL/L (ref 136–145)
WBC # BLD AUTO: 6.2 K/UL (ref 3.6–11)

## 2022-08-24 PROCEDURE — 85027 COMPLETE CBC AUTOMATED: CPT

## 2022-08-24 PROCEDURE — 65270000029 HC RM PRIVATE

## 2022-08-24 PROCEDURE — 74011250637 HC RX REV CODE- 250/637: Performed by: OBSTETRICS & GYNECOLOGY

## 2022-08-24 PROCEDURE — 59200 INSERT CERVICAL DILATOR: CPT | Performed by: MIDWIFE

## 2022-08-24 PROCEDURE — 36415 COLL VENOUS BLD VENIPUNCTURE: CPT

## 2022-08-24 PROCEDURE — 80053 COMPREHEN METABOLIC PANEL: CPT

## 2022-08-24 PROCEDURE — 4A1HXCZ MONITORING OF PRODUCTS OF CONCEPTION, CARDIAC RATE, EXTERNAL APPROACH: ICD-10-PCS | Performed by: OBSTETRICS & GYNECOLOGY

## 2022-08-24 PROCEDURE — 75410000002 HC LABOR FEE PER 1 HR: Performed by: MIDWIFE

## 2022-08-24 PROCEDURE — 84156 ASSAY OF PROTEIN URINE: CPT

## 2022-08-24 PROCEDURE — 3E0DXGC INTRODUCTION OF OTHER THERAPEUTIC SUBSTANCE INTO MOUTH AND PHARYNX, EXTERNAL APPROACH: ICD-10-PCS | Performed by: OBSTETRICS & GYNECOLOGY

## 2022-08-24 PROCEDURE — 74011250637 HC RX REV CODE- 250/637: Performed by: NURSE PRACTITIONER

## 2022-08-24 PROCEDURE — 3E0P7VZ INTRODUCTION OF HORMONE INTO FEMALE REPRODUCTIVE, VIA NATURAL OR ARTIFICIAL OPENING: ICD-10-PCS | Performed by: OBSTETRICS & GYNECOLOGY

## 2022-08-24 RX ORDER — ONDANSETRON 2 MG/ML
4 INJECTION INTRAMUSCULAR; INTRAVENOUS
Status: DISCONTINUED | OUTPATIENT
Start: 2022-08-24 | End: 2022-08-26

## 2022-08-24 RX ORDER — OXYTOCIN/RINGER'S LACTATE 30/500 ML
0-20 PLASTIC BAG, INJECTION (ML) INTRAVENOUS
Status: CANCELLED | OUTPATIENT
Start: 2022-08-24

## 2022-08-24 RX ORDER — OXYTOCIN/RINGER'S LACTATE 30/500 ML
0-20 PLASTIC BAG, INJECTION (ML) INTRAVENOUS
Status: DISCONTINUED | OUTPATIENT
Start: 2022-08-25 | End: 2022-08-26

## 2022-08-24 RX ORDER — SODIUM CHLORIDE 0.9 % (FLUSH) 0.9 %
5-40 SYRINGE (ML) INJECTION EVERY 8 HOURS
Status: DISCONTINUED | OUTPATIENT
Start: 2022-08-24 | End: 2022-08-26

## 2022-08-24 RX ORDER — SODIUM CHLORIDE 0.9 % (FLUSH) 0.9 %
5-40 SYRINGE (ML) INJECTION AS NEEDED
Status: DISCONTINUED | OUTPATIENT
Start: 2022-08-24 | End: 2022-08-26

## 2022-08-24 RX ORDER — MAG HYDROX/ALUMINUM HYD/SIMETH 200-200-20
30 SUSPENSION, ORAL (FINAL DOSE FORM) ORAL
Status: DISCONTINUED | OUTPATIENT
Start: 2022-08-24 | End: 2022-08-26

## 2022-08-24 RX ORDER — OXYTOCIN/RINGER'S LACTATE 30/500 ML
10 PLASTIC BAG, INJECTION (ML) INTRAVENOUS AS NEEDED
Status: DISCONTINUED | OUTPATIENT
Start: 2022-08-24 | End: 2022-08-24 | Stop reason: ALTCHOICE

## 2022-08-24 RX ORDER — NIFEDIPINE 30 MG/1
30 TABLET, EXTENDED RELEASE ORAL DAILY
Status: DISCONTINUED | OUTPATIENT
Start: 2022-08-25 | End: 2022-08-26

## 2022-08-24 RX ORDER — NALBUPHINE HYDROCHLORIDE 10 MG/ML
10 INJECTION, SOLUTION INTRAMUSCULAR; INTRAVENOUS; SUBCUTANEOUS
Status: DISCONTINUED | OUTPATIENT
Start: 2022-08-24 | End: 2022-08-26

## 2022-08-24 RX ORDER — GUAIFENESIN 100 MG/5ML
81 LIQUID (ML) ORAL DAILY
COMMUNITY
End: 2022-08-27

## 2022-08-24 RX ORDER — OXYTOCIN/RINGER'S LACTATE 30/500 ML
87.3 PLASTIC BAG, INJECTION (ML) INTRAVENOUS AS NEEDED
Status: DISCONTINUED | OUTPATIENT
Start: 2022-08-24 | End: 2022-08-24 | Stop reason: ALTCHOICE

## 2022-08-24 RX ADMIN — Medication 25 MCG: at 19:57

## 2022-08-24 RX ADMIN — Medication 25 MCG: at 22:03

## 2022-08-24 RX ADMIN — Medication 25 MCG: at 17:02

## 2022-08-24 NOTE — PROGRESS NOTES
1710: This RN notifying Dr. Louis Romero that fetal heart tones were obtained high on abdomen.  MD verbalizing understanding and stating that she will come perform ultrasound this evening to confirm vertex

## 2022-08-24 NOTE — PROGRESS NOTES
1900: Bedside and Verbal shift change report given to KEKE Tomlinson, RN (oncoming nurse) by [facility]. Jimmie Burns, KELLY (offgoing nurse). Report included the following information SBAR, Intake/Output, MAR, Recent Results, and Quality Measures. 1902: This RN at bedside. Patient states +FM, denies LOF or vaginal bleeding. Abdomen palpated, soft and non-tender. Patient educated to call nursing for any needs. 2030: Patient states she has not been taking ordered Nifedepine XL as BP levels have been WNL. Patient states Jae PAGE aware. This RN discussing patient medication regimen with Jem PARRA. VORB to hold Nifedepine unless needed. 2213: This RN at bedside adjusting FHR monitor. Fetal monitoring difficult due to maternal size. 0030: Patient resting quietly, denies needs at this time. 0700: Bedside and Verbal shift change report given to KIM Hinson (oncoming nurse) by Roya Rios RN (offgoing nurse). Report included the following information SBAR, Intake/Output, MAR, Recent Results, and Quality Measures.

## 2022-08-24 NOTE — PROGRESS NOTES
1615 - Patient here for induction for gestational hypertension. Patient ambulated to room and resting comfortably in bed. 1710 - Fetal heart tones found on upper abdomen. Dr. Melissa Milton notified and coming to do ultrasound. [id number] - Dr. Melissa Milton performed ultrasound. Baby is vertex. 18 [phone] Bryant Candelario called by this RN to request patient prenatal records. [phone] [facility] received. 1900 - Verbal shift change report given to Raza Jerez RN (oncoming nurse) by [facility], RN (offgoing nurse). Report included the following information SBAR, Kardex, Intake/Output, MAR, and Recent Results.

## 2022-08-24 NOTE — H&P
History & Physical    Name: Markel Feliz MRN: [id number]  SSN: [id number]    YOB: 1990  Age: 34 y.o. Sex: female      Subjective:     Estimated Date of Delivery: None noted. OB History    Para Term  AB Living   4 2 2   1 2   SAB IAB Ectopic Molar Multiple Live Births     1     0 1      # Outcome Date GA Lbr Michael/2nd Weight Sex Delivery Anes PTL Lv   4 Current            3 Term 19 39w1d 11:41 / 00:22 3.94 kg F Vag-Spont EPI N YFN   2 Term            1 IAB                Ms. Toñito Medina is admitted with pregnancy at 37 weeks and 1 day for induction of labor due to gestational HTN and BMI >40. Prenatal course was complicated by pregnancy induced hypertension and BMI 42 AT START OF CARE AND LATE ENTRY INTO PRENATAL CARE . Most recent pr eclamptic labs are normal  with ur pr/ Cr ration 0.12   Please see prenatal records for details. Past Medical History:   Diagnosis Date    Gestational hypertension 2022     No past surgical history on file. Social History     Occupational History    Not on file   Tobacco Use    Smoking status: Never    Smokeless tobacco: Never   Substance and Sexual Activity    Alcohol use: No    Drug use: No    Sexual activity: Yes     Partners: Male     Birth control/protection: None     No family history on file. No Known Allergies  Prior to Admission medications    Medication Sig Start Date End Date Taking? Authorizing Provider   ibuprofen (MOTRIN) 800 mg tablet Take 1 Tab by mouth every eight (8) hours. 19   David Rowe MD   NIFEdipine ER (PROCARDIA XL) 30 mg ER tablet Take 1 Tab by mouth daily. 19   David Rowe MD   [id number] araseli-folic-dha (PRENATAL DHA+COMPLETE PRENATAL) -538 mg-mcg-mg cmpk Take 1 Cap by mouth. Indications: pregnancy    Provider, Historical        Review of Systems: A comprehensive review of systems was negative except for that written in the History of Present Illness.     Objective: Vitals: There were no vitals filed for this visit. Physical Exam:  Patient without distress. Abdomen: soft, nontender  Fundus: soft and non tender  Membranes:  Intact  Fetal Heart Rate: Reactive      CE in office - 1/40/-3       Prenatal Labs:   Lab Results   Component Value Date/Time    Rubella, External Immune 02/25/2019 12:00 AM    HBsAg, External Negative 02/25/2019 12:00 AM    HIV, External Non-Reactive 02/25/2019 12:00 AM    RPR, External Non-Reactive 02/25/2019 12:00 AM    Gonorrhea, External Negative 02/25/2019 12:00 AM    Chlamydia, External Negative 02/25/2019 12:00 AM    ABO,Rh O positive 02/25/2019 12:00 AM    GrBStrep, External Negative 08/26/2019 12:00 AM       Impression/Plan:     Active Problems:    Term pregnancy (8/24/2022)      Gestational hypertension (8/24/2022)      37 weeks gestation of pregnancy (8/24/2022)         Plan: Admit for induction of labor. Group B Strep negative.   Cyctotec 25 mcg q 2 hour po followed by CRB if needed   GBS negative   Admission labs as well ads HTN labs     Marcus Eaton MD

## 2022-08-25 PROCEDURE — 74011250637 HC RX REV CODE- 250/637: Performed by: NURSE PRACTITIONER

## 2022-08-25 PROCEDURE — 65270000029 HC RM PRIVATE

## 2022-08-25 PROCEDURE — 75410000002 HC LABOR FEE PER 1 HR: Performed by: MIDWIFE

## 2022-08-25 PROCEDURE — 74011250636 HC RX REV CODE- 250/636: Performed by: OBSTETRICS & GYNECOLOGY

## 2022-08-25 PROCEDURE — 76060000078 HC EPIDURAL ANESTHESIA: Performed by: STUDENT IN AN ORGANIZED HEALTH CARE EDUCATION/TRAINING PROGRAM

## 2022-08-25 PROCEDURE — 2709999900 HC NON-CHARGEABLE SUPPLY

## 2022-08-25 PROCEDURE — 10907ZC DRAINAGE OF AMNIOTIC FLUID, THERAPEUTIC FROM PRODUCTS OF CONCEPTION, VIA NATURAL OR ARTIFICIAL OPENING: ICD-10-PCS | Performed by: OBSTETRICS & GYNECOLOGY

## 2022-08-25 PROCEDURE — 3E033VJ INTRODUCTION OF OTHER HORMONE INTO PERIPHERAL VEIN, PERCUTANEOUS APPROACH: ICD-10-PCS | Performed by: OBSTETRICS & GYNECOLOGY

## 2022-08-25 PROCEDURE — 77030005513 HC CATH URETH FOL11 MDII -B

## 2022-08-25 RX ORDER — SODIUM CHLORIDE, SODIUM LACTATE, POTASSIUM CHLORIDE, CALCIUM CHLORIDE 600; 310; 30; 20 MG/100ML; MG/100ML; MG/100ML; MG/100ML
125 INJECTION, SOLUTION INTRAVENOUS CONTINUOUS
Status: DISCONTINUED | OUTPATIENT
Start: 2022-08-25 | End: 2022-08-26

## 2022-08-25 RX ADMIN — SODIUM CHLORIDE, POTASSIUM CHLORIDE, SODIUM LACTATE AND CALCIUM CHLORIDE 999 ML/HR: 600; 310; 30; 20 INJECTION, SOLUTION INTRAVENOUS at 20:55

## 2022-08-25 RX ADMIN — SODIUM CHLORIDE, POTASSIUM CHLORIDE, SODIUM LACTATE AND CALCIUM CHLORIDE 125 ML/HR: 600; 310; 30; 20 INJECTION, SOLUTION INTRAVENOUS at 15:24

## 2022-08-25 RX ADMIN — SODIUM CHLORIDE, POTASSIUM CHLORIDE, SODIUM LACTATE AND CALCIUM CHLORIDE 999 ML/HR: 600; 310; 30; 20 INJECTION, SOLUTION INTRAVENOUS at 22:58

## 2022-08-25 RX ADMIN — Medication 25 MCG: at 02:28

## 2022-08-25 RX ADMIN — Medication 12 MILLI-UNITS/MIN: at 11:50

## 2022-08-25 RX ADMIN — Medication 1 MILLI-UNITS/MIN: at 06:47

## 2022-08-25 RX ADMIN — Medication 25 MCG: at 00:24

## 2022-08-25 NOTE — PROGRESS NOTES
0700 - Verbal shift change report given to Abelardo Beasley (oncoming nurse) by Moi Hair, KELLY (offgoing nurse). Report included the following information SBAR, Kardex, Intake/Output, MAR, and Recent Results. 0930 - Dr. Betty Chaudhry at bedside for SVE. Patient 3/50/-3.     1105 - Verbal shift change report given to Bettina Man RN by Marielle Cueto RN. Report included the following information SBAR, Kardex, Intake/Output, MAR, and Recent Results.

## 2022-08-25 NOTE — PROGRESS NOTES
Patient feeling some discomfort with ctx but not yet requesting epidural.    FHR category 1  [place] q 3-4 min    Cvx 50%/4cm/-2    AROM attempted, but attempts discontinued due to patient discomfort. A/P  37w2d undergoing IOL for GHTN. Continue IOL with pitocin. Plan AROM next check (2-4 hrs). Epidural on patient request. Continue plan for vaginal delivery.

## 2022-08-25 NOTE — PROGRESS NOTES
[facility] Labor Progress Note     Patient: Africa Spears MRN: [id number]  SSN: [id number]    YOB: 1990  Age: 34 y.o. Sex: female        Subjective:   Patient reports that she is feeling well and has no issues. Objective:   Blood pressure 124/68, pulse 100, temperature 97.8 °F (36.6 °C), resp. rate 16, height 5' 5\" (1.651 m), weight 125.6 kg (277 lb), SpO2 99 %, unknown if currently breastfeeding. Fetal heart baseline 130 , moderate variability, positive accelerations, negative decelerations, Uterine contractions occasional minutes, resting tone soft, Sterile Vaginal Exam:  deferred at this time. Previous exam 1/40/-3.       Assessment:     37w1d  Category 1 fetal heart rate tracing   IOL for gHTN      Plan:   Continue current orders/management     Anticipate     Suhail Pyle CNM

## 2022-08-25 NOTE — PROGRESS NOTES
1500: SBAR report received from [address] [place] [address] [id number]: Dr. Redd Tobias at bedside. SVE 4/50/-2. Unable to AROM at this time. 1705: Pt taking a break from lying on back and lying on side. Unable to trace contractions. 2138: CNM at bedside. SVE unchanged. AROM clear fluid. 2300: SBAR report given to MOISES Larson RN.

## 2022-08-25 NOTE — PROGRESS NOTES
Patient reports only mild discmfort with ctx. /86 (BP 1 Location: Left upper arm, BP Patient Position: At rest)   Pulse 90   Temp 98.1 °F (36.7 °C)   Resp 16   Ht 5' 5\" (1.651 m)   Wt 125.6 kg (277 lb)   SpO2 100%   BMI 46.10 kg/m²     FHR category 1  [place] irregular    Cvx 50%/3cm/-3    A/P 35 yo  37w2d undergoing IOL for GHTN. S/p misoprostol overnight, now on 3cm/50%. Continue IOL with pitocin. Plan AROM when fetal vertex better engaged. Epidural on patient request. Continue plan for vaginal delivery.

## 2022-08-26 ENCOUNTER — ANESTHESIA (OUTPATIENT)
Dept: LABOR AND DELIVERY | Age: 32
DRG: 560 | End: 2022-08-26
Payer: MEDICAID

## 2022-08-26 ENCOUNTER — ANESTHESIA EVENT (OUTPATIENT)
Dept: LABOR AND DELIVERY | Age: 32
DRG: 560 | End: 2022-08-26
Payer: MEDICAID

## 2022-08-26 PROCEDURE — 74011250637 HC RX REV CODE- 250/637: Performed by: MIDWIFE

## 2022-08-26 PROCEDURE — 74011250636 HC RX REV CODE- 250/636: Performed by: MIDWIFE

## 2022-08-26 PROCEDURE — 74011000250 HC RX REV CODE- 250: Performed by: STUDENT IN AN ORGANIZED HEALTH CARE EDUCATION/TRAINING PROGRAM

## 2022-08-26 PROCEDURE — 74011250637 HC RX REV CODE- 250/637: Performed by: OBSTETRICS & GYNECOLOGY

## 2022-08-26 PROCEDURE — 75410000002 HC LABOR FEE PER 1 HR: Performed by: MIDWIFE

## 2022-08-26 PROCEDURE — 75410000000 HC DELIVERY VAGINAL/SINGLE: Performed by: MIDWIFE

## 2022-08-26 PROCEDURE — 75410000003 HC RECOV DEL/VAG/CSECN EA 0.5 HR: Performed by: MIDWIFE

## 2022-08-26 PROCEDURE — 65270000029 HC RM PRIVATE

## 2022-08-26 PROCEDURE — 74011250636 HC RX REV CODE- 250/636: Performed by: STUDENT IN AN ORGANIZED HEALTH CARE EDUCATION/TRAINING PROGRAM

## 2022-08-26 RX ORDER — NIFEDIPINE 30 MG/1
30 TABLET, EXTENDED RELEASE ORAL DAILY
Status: DISCONTINUED | OUTPATIENT
Start: 2022-08-26 | End: 2022-08-27 | Stop reason: HOSPADM

## 2022-08-26 RX ORDER — ACETAMINOPHEN 500 MG
1000 TABLET ORAL EVERY 8 HOURS
Qty: 60 TABLET | Refills: 1 | Status: SHIPPED | OUTPATIENT
Start: 2022-08-26

## 2022-08-26 RX ORDER — ACETAMINOPHEN 500 MG
1000 TABLET ORAL
Status: DISCONTINUED | OUTPATIENT
Start: 2022-08-26 | End: 2022-08-27 | Stop reason: HOSPADM

## 2022-08-26 RX ORDER — IBUPROFEN 800 MG/1
800 TABLET ORAL EVERY 8 HOURS
Qty: 30 TABLET | Refills: 1 | Status: SHIPPED | OUTPATIENT
Start: 2022-08-26

## 2022-08-26 RX ORDER — OXYTOCIN/RINGER'S LACTATE 30/500 ML
10 PLASTIC BAG, INJECTION (ML) INTRAVENOUS AS NEEDED
Status: DISCONTINUED | OUTPATIENT
Start: 2022-08-26 | End: 2022-08-27 | Stop reason: HOSPADM

## 2022-08-26 RX ORDER — NALOXONE HYDROCHLORIDE 0.4 MG/ML
0.4 INJECTION, SOLUTION INTRAMUSCULAR; INTRAVENOUS; SUBCUTANEOUS ONCE
Status: DISCONTINUED | OUTPATIENT
Start: 2022-08-26 | End: 2022-08-26

## 2022-08-26 RX ORDER — OXYCODONE HYDROCHLORIDE 5 MG/1
5 TABLET ORAL
Status: DISCONTINUED | OUTPATIENT
Start: 2022-08-26 | End: 2022-08-27 | Stop reason: HOSPADM

## 2022-08-26 RX ORDER — NALOXONE HYDROCHLORIDE 0.4 MG/ML
0.4 INJECTION, SOLUTION INTRAMUSCULAR; INTRAVENOUS; SUBCUTANEOUS AS NEEDED
Status: DISCONTINUED | OUTPATIENT
Start: 2022-08-26 | End: 2022-08-27 | Stop reason: HOSPADM

## 2022-08-26 RX ORDER — DOCUSATE SODIUM 100 MG/1
100 CAPSULE, LIQUID FILLED ORAL
Qty: 60 CAPSULE | Refills: 1 | Status: SHIPPED | OUTPATIENT
Start: 2022-08-26 | End: 2022-11-24

## 2022-08-26 RX ORDER — OXYTOCIN/RINGER'S LACTATE 30/500 ML
87.3 PLASTIC BAG, INJECTION (ML) INTRAVENOUS AS NEEDED
Status: COMPLETED | OUTPATIENT
Start: 2022-08-26 | End: 2022-08-26

## 2022-08-26 RX ORDER — FENTANYL/BUPIVACAINE/NS/PF 2-1250MCG
1-16 PREFILLED PUMP RESERVOIR EPIDURAL CONTINUOUS
Status: DISCONTINUED | OUTPATIENT
Start: 2022-08-26 | End: 2022-08-26

## 2022-08-26 RX ORDER — EPHEDRINE SULFATE/0.9% NACL/PF 50 MG/5 ML
10 SYRINGE (ML) INTRAVENOUS
Status: DISCONTINUED | OUTPATIENT
Start: 2022-08-26 | End: 2022-08-26

## 2022-08-26 RX ORDER — LIDOCAINE HYDROCHLORIDE AND EPINEPHRINE 15; 5 MG/ML; UG/ML
INJECTION, SOLUTION EPIDURAL
Status: SHIPPED | OUTPATIENT
Start: 2022-08-26 | End: 2022-08-26

## 2022-08-26 RX ORDER — DOCUSATE SODIUM 100 MG/1
100 CAPSULE, LIQUID FILLED ORAL
Status: DISCONTINUED | OUTPATIENT
Start: 2022-08-26 | End: 2022-08-27 | Stop reason: HOSPADM

## 2022-08-26 RX ORDER — IBUPROFEN 800 MG/1
800 TABLET ORAL EVERY 8 HOURS
Status: DISCONTINUED | OUTPATIENT
Start: 2022-08-26 | End: 2022-08-27 | Stop reason: HOSPADM

## 2022-08-26 RX ADMIN — IBUPROFEN 800 MG: 800 TABLET, FILM COATED ORAL at 23:17

## 2022-08-26 RX ADMIN — Medication 12 ML/HR: at 00:22

## 2022-08-26 RX ADMIN — IBUPROFEN 800 MG: 800 TABLET, FILM COATED ORAL at 14:15

## 2022-08-26 RX ADMIN — ACETAMINOPHEN 1000 MG: 500 TABLET ORAL at 18:03

## 2022-08-26 RX ADMIN — NIFEDIPINE 30 MG: 30 TABLET, FILM COATED, EXTENDED RELEASE ORAL at 15:31

## 2022-08-26 RX ADMIN — LIDOCAINE HYDROCHLORIDE AND EPINEPHRINE 3.5 ML: 15; 5 INJECTION, SOLUTION EPIDURAL at 00:00

## 2022-08-26 RX ADMIN — IBUPROFEN 800 MG: 800 TABLET, FILM COATED ORAL at 06:31

## 2022-08-26 RX ADMIN — SODIUM CHLORIDE, POTASSIUM CHLORIDE, SODIUM LACTATE AND CALCIUM CHLORIDE 1000 ML: 600; 310; 30; 20 INJECTION, SOLUTION INTRAVENOUS at 00:06

## 2022-08-26 RX ADMIN — Medication 87.3 MILLI-UNITS/MIN: at 05:17

## 2022-08-26 NOTE — PROGRESS NOTES
2300 Report received, care of patient assumed at this time. 2341 Pt assisting to sitting position for epidural placement. Rony Tse at the bedside to assist with fetal monitoring. 12 KIM Baker CNM at the bedside to assist with turning patient. EFM tracing observed. 0420 Pt reports feeling pressure, KIM Baker CNM called and made aware.

## 2022-08-26 NOTE — PROGRESS NOTES
Labor Progress Note:    Key Events:     2022  -Admitted to labor and delivery d/t gestational hypertension    2022  -Misoprostol s/p 5 doses  -@ 0747 Pitocin started. On 20 milliUnits/min since       SUBJECTIVE:  Francisco Gerard willing to attempt AROM again. Excited about getting the show on the road. States was induced twice previously and both times went quickly. Reports postpartum hemorrhage with second delivery. Kydra feeling contractions, but not painful. Able to breathe and move through them. OBJECTIVE:  Patient Vitals for the past 4 hrs:   Temp Pulse BP SpO2   22 -- 79 134/83 --   22 -- -- -- 100 %   22 -- -- -- 100 %   22 98.5 °F (36.9 °C) 83 135/77 --         Gen: alert and oriented, appears comfortable  FHT Baseline: 125 bpm, moderate variability, accelerations present, decelerations absent  TOCO: q 3 minutes, 50-70 seconds  Cervix: 4cm/50%/-2 station, soft consistency/posterior position  MEMBRANES: AROM at  with clear fluid    ASSESSMENT:  34 y.o.  at 37w2d IOL for d/t gestational hypertension  Cat I tracing- fetal status reassuring  GBS Negative  ROM at 2138  Pitocin x 16 hours  Normotensive    PLAN:  -Discussed R/B/A of artificial rupture of membranes including cord prolapse, infection and fetal intolerance. Patient verbalized understanding and would like to proceed with AROM.  AROM completed without difficulty  -Encourage frequent position changes and upright positions to facilitate labor progression and fetal descent  -Continue pitocin per protocol  -Pain management per patient request. Patient interested in an epidural    Raya Miles CNM

## 2022-08-26 NOTE — PROGRESS NOTES
Bedside and Verbal shift change report given to [address] [place] [address] (oncoming nurse) by Cecily Rodriguez (offgoing nurse). Report included the following information SBAR, Kardex, and MAR.

## 2022-08-26 NOTE — ANESTHESIA PREPROCEDURE EVALUATION
Relevant Problems   No relevant active problems       Anesthetic History   No history of anesthetic complications            Review of Systems / Medical History  Patient summary reviewed and pertinent labs reviewed    Pulmonary  Within defined limits                 Neuro/Psych   Within defined limits           Cardiovascular  Within defined limits                Exercise tolerance: >4 METS     GI/Hepatic/Renal     GERD           Endo/Other        Morbid obesity     Other Findings              Physical Exam    Airway  Mallampati: II  TM Distance: 4 - 6 cm  Neck ROM: normal range of motion   Mouth opening: Normal     Cardiovascular  Regular rate and rhythm,  S1 and S2 normal,  no murmur, click, rub, or gallop  Rhythm: regular  Rate: normal         Dental  No notable dental hx    Comments: Chipped right upper premolar   Pulmonary                 Abdominal         Other Findings            Anesthetic Plan    ASA: 3  Anesthesia type: epidural      Post-op pain plan if not by surgeon: indwelling epidural catheter      Anesthetic plan and risks discussed with: Patient

## 2022-08-26 NOTE — ANESTHESIA PROCEDURE NOTES
Epidural Block    Patient location during procedure: OB  Start time: 8/25/2022 11:45 AM  End time: 8/26/2022 12:02 AM  Reason for block: labor epidural  Staffing  Performed: attending   Anesthesiologist: Ananth Hinojosa DO  Preanesthetic Checklist  Completed: patient identified, IV checked, site marked, risks and benefits discussed, surgical consent, monitors and equipment checked, pre-op evaluation, timeout performed and fire risk safety assessment completed and verbalized  Block Placement  Patient position: sitting  Prep: ChloraPrep  Sterility prep: mask, gloves, drape and cap  Sedation level: no sedation  Patient monitoring: frequent blood pressure checks and heart rate  Approach: midline  Location: lumbar  Lumbar location: L2-L3  Epidural  Loss of resistance technique: saline  Guidance: landmark technique  Needle  Needle type: Tuohy   Needle gauge: 17 G  Needle length: 10 cm  Needle insertion depth: 9 cm  Catheter type: end hole  Catheter size: 20 G  Catheter at skin depth: 15 cm  Catheter securement method: clear occlusive dressing, liquid medical adhesive and surgical tape  Medications Administered  lidocaine-EPINEPHrine (XYLOCAINE) 1.5 %-1:200,000 Epidural - Epidural   3.5 mL - 8/26/2022 12:00:00 AM  Assessment  Block outcome: pain improved  Number of attempts: 1  Procedure assessment: patient tolerated procedure well with no immediate complications

## 2022-08-26 NOTE — PROGRESS NOTES
TRANSFER - OUT REPORT:    Verbal report given to Amanda Cat RN(name) on [facility]  being transferred to MIU(unit) for routine progression of care       Report consisted of patients Situation, Background, Assessment and   Recommendations(SBAR). Information from the following report(s) SBAR, Kardex, Procedure Summary, Intake/Output, MAR, and Recent Results was reviewed with the receiving nurse. Lines:   Peripheral IV 08/24/22 Left;Posterior Hand (Active)   Site Assessment Clean, dry, & intact 08/26/22 0754   Phlebitis Assessment 0 08/26/22 0754   Infiltration Assessment 0 08/26/22 0754   Dressing Status Clean, dry, & intact 08/26/22 0754   Dressing Type Transparent;Tape 08/26/22 0754   Hub Color/Line Status Infusing 08/26/22 0754   Action Taken Open ports on tubing capped 08/25/22 0714   Alcohol Cap Used Yes 08/25/22 9435        Opportunity for questions and clarification was provided.       Patient transported with:   Registered Nurse

## 2022-08-26 NOTE — L&D DELIVERY NOTE
Delivery Summary    Patient: Melanie Juarez MRN: [id number]  SSN: [id number]    YOB: 1990  Age: 34 y.o. Sex: female       Information for the patient's :  Oscar Robins, Male My Villagomez [[id number]]     Labor Events:    Labor: No    Steroids: None   Cervical Ripening Date/Time:       Cervical Ripening Type: Misoprostol   Antibiotics During Labor: No   Rupture Identifier:      Rupture Date/Time: 2022 9:38 PM   Rupture Type: AROM   Amniotic Fluid Volume: Moderate    Amniotic Fluid Description: Clear    Amniotic Fluid Odor: None    Induction: Prostaglandins       Induction Date/Time: 2022 5:02 PM    Indications for Induction: Gestational Hypertension    Augmentation:     Augmentation Date/Time:      Indications for Augmentation:     Labor complications: Additional complications:        Delivery Events:  Indications For Episiotomy:     Episiotomy: None   Perineal Laceration(s): None   Repaired:     Periurethral Laceration Location:      Repaired:     Labial Laceration Location:     Repaired:     Sulcal Laceration Location:     Repaired:     Vaginal Laceration Location:     Repaired:     Cervical Laceration Location:     Repaired:     Repair Suture: None   Number of Repair Packets:     Estimated Blood Loss (ml):  ml   Quantitative Blood Loss (ml)                Delivery Date: 2022    Delivery Time: 4:45 AM  Delivery Type: Vaginal, Spontaneous  Sex:  Male    Gestational Age: 44w3d   Delivery Clinician:  Wendy Torres  Living Status: Living   Delivery Location: L&D 204          APGARS  One minute Five minutes Ten minutes   Skin color: 1   1        Heart rate: 2   2        Grimace: 2   2        Muscle tone: 2   2        Breathin   2        Totals: 9   9            Presentation: Vertex    Position: Right Occiput Anterior  Resuscitation Method:  Suctioning-bulb; Tactile Stimulation     Meconium Stained: None      Cord Information: 3 Vessels  Complications: None  Cord around:    Delayed cord clamping? Yes  Cord clamped date/time:2022  4:49 AM  Disposition of Cord Blood: Lab    Blood Gases Sent?: No    Placenta:  Date/Time: 2022  4:50 AM  Removal: Spontaneous      Appearance: Normal;Intact      Measurements:  Birth Weight:        Birth Length:        Head Circumference:        Chest Circumference:       Abdominal Girth: Other Providers:   CHITO Faulkner;EDY SANZ;ALMA BARAJAS RACHEL E;YOCUM, CYNTHIA A, Primary Nurse;Primary  Nurse;Midwife;Nursery Nurse;Charge Nurse;Scrub Tech         Group B Strep:   Lab Results   Component Value Date/Time    GrBStrep, External Negative 2022 12:00 AM     Maite Perez was admitted to labor and delivery 2022 for induction of labor due to gestational hypertension. She progressed well through 1st and 2nd stage of labor. Labor induction with misoprostol, pitocin and artificial rupture of membranes. She elected to utilize an epidural for pain management. Prenatal course was notable for morbid obesity, late entry to prenatal care and gestational hypertension. Donna Lacy progressed to complete and started pushing at 22 [id number] on 2022. She pushed with great effort for 5 minutes to a spontaneous vaginal delivery of viable male infant at 18. Infant in OA position and restituted to ABEBA. Infant's head delivered with gentle downward traction and shoulders delivered without extra maneuvers. No nuchal cord present. Infant brought to maternal abdomen for initial NRP. Infant dried and stimulated to spontaneous cry. Cord clamped twice and cut by FOB after delayed cord clamping. Cord blood collected due to maternal blood type O positive. Apgars 9 at 1 minute and 9 at 5 minutes. Placenta delivered spontaneously via Beverlie Repress mechanism at 3173. Placenta later inspected and found to be intact with three vessel cord.  Active management of the third stage of labor with IV pitocin per protocol and fundal massage. Perineum inspected and found to be intact. Sponge and instrument count correct and verified by RN. Mother and infant stable upon this provider leaving the room. Support at bedside. Breastfeeding to be initiated.       mL  GBS Negative  Infant weight pending at the time of this note    Mona Quinones CNM

## 2022-08-27 VITALS
OXYGEN SATURATION: 99 % | RESPIRATION RATE: 16 BRPM | WEIGHT: 277 LBS | HEIGHT: 65 IN | TEMPERATURE: 99 F | SYSTOLIC BLOOD PRESSURE: 138 MMHG | DIASTOLIC BLOOD PRESSURE: 86 MMHG | HEART RATE: 93 BPM | BODY MASS INDEX: 46.15 KG/M2

## 2022-08-27 PROCEDURE — 74011250637 HC RX REV CODE- 250/637: Performed by: OBSTETRICS & GYNECOLOGY

## 2022-08-27 PROCEDURE — 74011250637 HC RX REV CODE- 250/637: Performed by: MIDWIFE

## 2022-08-27 RX ORDER — NIFEDIPINE 30 MG/1
30 TABLET, EXTENDED RELEASE ORAL DAILY
Qty: 30 TABLET | Refills: 1 | Status: SHIPPED | OUTPATIENT
Start: 2022-08-28

## 2022-08-27 RX ORDER — OXYTOCIN/RINGER'S LACTATE 30/500 ML
PLASTIC BAG, INJECTION (ML) INTRAVENOUS
Status: DISCONTINUED
Start: 2022-08-27 | End: 2022-08-27

## 2022-08-27 RX ADMIN — IBUPROFEN 800 MG: 800 TABLET, FILM COATED ORAL at 06:35

## 2022-08-27 RX ADMIN — NIFEDIPINE 30 MG: 30 TABLET, FILM COATED, EXTENDED RELEASE ORAL at 09:02

## 2022-08-27 NOTE — DISCHARGE SUMMARY
Obstetrical Discharge Summary     Name: Orlando Warner MRN: [id number]  SSN: [id number]    YOB: 1990  Age: 34 y.o. Sex: female      Admit Date: 2022    Discharge Date: 2022     Admitting Physician: Anabell Munoz MD     Attending Physician:  Claude Umana MD     Admission Diagnoses: Term pregnancy [Z34.90]  Gestational hypertension [O13.9]  37 weeks gestation of pregnancy [Z3A.37]    Discharge Diagnoses:   Information for the patient's :  Cheri Bustillo, Male Jose Gutierresed [[id number]]   Delivery of a 3.135 kg male infant via Vaginal, Spontaneous on 2022 at 4:45 AM  by Art Pastdonovan. Apgars were 9  and 9 . Additional Diagnoses:   Hospital Problems  Never Reviewed            Codes Class Noted POA    Term pregnancy ICD-10-CM: Z34.90  ICD-9-CM: V22.1  2022 Unknown        Gestational hypertension ICD-10-CM: O13.9  ICD-9-CM: 642.30  2022 Unknown        37 weeks gestation of pregnancy ICD-10-CM: Z3A.37  ICD-9-CM: V22.2  2022 Unknown          Lab Results   Component Value Date/Time    Rubella, External Immune 2022 12:00 AM    GrBStrep, External Negative 2022 12:00 AM       Hospital Course: Postpartum course was complicated by gestational hypertension. Today, patient without significant complaint. Denies fever, chills, HA, VC, CP, SOB, RUQ pain, calf pain. Lochia improving. Will DC home on procardia. Discussed in office follow up in 1-2 weeks    Patient Instructions:   Current Discharge Medication List        START taking these medications    Details   docusate sodium (Colace) 100 mg capsule Take 1 Capsule by mouth two (2) times daily as needed for Constipation for up to 90 days. Qty: 60 Capsule, Refills: 1      acetaminophen (TYLENOL) 500 mg tablet Take 2 Tablets by mouth every eight (8) hours. Qty: 60 Tablet, Refills: 1           CONTINUE these medications which have CHANGED    Details   NIFEdipine ER (PROCARDIA XL) 30 mg ER tablet Take 1 Tablet by mouth daily.   Qty: 30 Tablet, Refills: 1      ibuprofen (MOTRIN) 800 mg tablet Take 1 Tablet by mouth every eight (8) hours. Qty: 30 Tablet, Refills: 1           CONTINUE these medications which have NOT CHANGED    Details   Iron BisGl &PS [id number] mg-mg-mcg-mg cap Take  by mouth.      [id number] araseli-folic-dha -335 mg-mcg-mg cmpk Take 1 Cap by mouth. Indications: pregnancy           STOP taking these medications       aspirin 81 mg chewable tablet Comments:   Reason for Stopping:               Disposition at Discharge: Home or self care    Condition at Discharge: Stable    Reference my discharge instructions. Follow-up Appointments   Procedures    FOLLOW UP VISIT Appointment in: One Week     Standing Status:   Standing     Number of Occurrences:   1     Order Specific Question:   Appointment in     Answer:    One Week        Signed By:  Ailyn Mckeon MD     August 27, 2022

## 2022-08-27 NOTE — LACTATION NOTE
This note was copied from a baby's chart. Mom states baby has been cluster feeding this AM. Normal  behaviors reviewed. Mom reassured that she is appropriately responding to babies cues of hunger - output adequate and weight loss WNL. Skin to so skin encouraged. Dyad for discharge today. Chart shows numerous feedings, void, stool WNL. Discussed importance of monitoring outputs and feedings on first week of life. Discussed ways to tell if baby is  getting enough breast milk, ie  voids and stools, change in color of stool, and return to birth wt within 2 weeks. Follow up with pediatrician visit for weight check in 1-2 days (per AAP guidelines.)  Encouraged to call Warm Line  [phone]  for any questions/problems that arise. Mother also given breastfeeding support group dates and times for any future needs. Pt will successfully establish breastfeeding by feeding in response to early feeding cues   or wake every 3h, will obtain deep latch, and will keep log of feedings/output. Taught to BF at hunger cues and or q 2-3 hrs and to offer 10-20 drops of hand expressed colostrum at any non-feeds.       Breast Assessment  Left Breast: Large  Left Nipple: Everted, Intact  Right Breast: Large  Right Nipple: Everted, Intact  Breast- Feeding Assessment  Attends Breast-Feeding Classes: No  Breast-Feeding Experience: Yes (up to a year with first baby, 9 months with second)  Breast Trauma/Surgery: No  Type/Quality: Good (per mother)  Lactation Consultant Visits  Breast-Feedings: Good   Mother/Infant Observation  Mother Observation: Breast comfortable, Recognizes feeding cues  Infant Observation: Opens mouth  LATCH Documentation  Latch:  (mom encouraged to call next feed)  Audible Swallowing: A few with stimulation  Type of Nipple: Everted (after stimulation)  Comfort (Breast/Nipple): Soft/non-tender  Hold (Positioning): No assist from staff, mother able to position/hold infant  LATCH Score: 9

## 2022-09-01 NOTE — ADT AUTH CERT NOTES
NEW IN MATERNITY ADMIT   FINALLY OBTAINED INS INFO  Patient Class History    Date / Time Event Patient Class   2022 04:08 PM Admission INPATIENT   Discharge    Discharged: 2022 1359   Discharging provider: Terrance Leblanc MD   Discharge disposition: Home or [facility] 1429 Name: Emily 31- [id number]     ORDER PATIENT CLASS IS INPATIENT [101]  DX: Term pregnancy [Z34.90]  Gestational hypertension [O13.9]  37 weeks gestation of pregnancy [Z3A.37]    Admitting provider: Terrance Leblanc MD                 Patient Demographics    Patient Name   Tyree Ruiz Legal Sex   Female    1990 Address   [address] [address]  [address] Phone   [phone] (Home)   [phone] [facility] Account    Name Acct ID Class Status Primary Coverage   Tyree Ruiz [id number] INPATIENT Discharged/Not [facility]            Guarantor Account (for Hospital Account [de-identified])    Name Relation to Pt Service Area Active? Acct Type   Tyree Ruiz Self [facility] HOSPITAL Yes Personal/Family   Address Phone     [facility] [address] [address] [facility] [phone](N)              Coverage Information (for Hospital Account [de-identified])    F/O Payor/Plan Subscriber  Subscriber Sex Precert #   BLUE CROSS [place]/[place] BLUE [place]OSS HEALTHKEEPERS [id number] [id number]    Subscriber Subscriber #   Tyree Ruiz [id number]   [facility] # Group Name       Address Phone   [address]    Policy Number Status Effective Date Benefits Phone   [id number] -  -   Auth/Cert                 Admission Information    Arrival Date/Time: 2022 1608 Admit Date/Time: 2022 1608 IP Adm.  Date/Time: 2022 1608   Admission Type: Urgent Point of Origin: Non-health Care Facility/self Admit Category:    Means of Arrival:  Primary Service: Obstetrics Secondary Service: N/A   Transfer Source:  Service Area: [facility] Unit: [facility] 3 MOTHER INFANT   Admit Provider: Rafia Allen MD Attending Provider: Rosas Argueta MD Referring Provider:      Admission Information    Attending Provider Admission Dx Admitted on    Term pregnancy, Gestational hypertension, 37 weeks gestation of pregnancy 22   Service Isolation Code Status   OBSTETRICS -- Prior   Allergies Advance Care Planning    No Known Allergies Jump to the Activity         Admission Information    Unit/Bed: [facility] 3 MOTHER INFANT/ Service: OBSTETRICS   Admitting provider: Rafia Allen MD Phone: [phone]   Attending provider:  Phone:    PCP: None Phone:    Admission dx: Pregnancy Patient class: I   Admission type: UR         Patient Demographics    Patient Name   Abel Wheeler   [id number] Legal Sex   Female    1990 Address   [address] [address] Phone   [phone] [facility]   [phone] [address]  [phone]       Patient: Rosmery Christie  MRN: [id number]  Elizabetabrahan Hernandez     MRN: [id number]     Link to Baby  Comment        Baby's name MRN Account  Age Sex Admission Type    Mimi Castellon, Kaleb Choe U. 97. [id number] 22 6 days M Confirmed Discharge     OB History       4    Para   3    Term   3            AB   1    Living   3      SAB        IAB   1    Ectopic        Molar        Multiple   0    Live Births   2         # Outcome Date GA Labor/2nd Weight Sex Delivery Anes PTL Lv A1 A5   1 IAB                 2 Term                 3 Term 19 39w1d 11h 41m / 0h 22m 3.94 kg F Vag-Spont Epidural N Living 9 9   Name: Avel Bustos   Location: Other   Delivering Clinician: Leyda Kaplan MD      4 Term 22 37w3d / 0h 15m 3.135 kg M Vag-Spont Epidural N Living 9 9   Name: Miriam Ortez   Location: Other   Delivering Clinician: Jeannine Cali CNM        Prenatal History    Flowsheet Row Most Recent Value   Did You Receive Prenatal Care Yes   Name Of OB Provider Noffsinger   Seen By MFM (Maternal Fetal Medicine)? No   Fetal Ultrasound Abnormalities/Concerns?  No   Infant Feeding Breast Milk   Circumcision Planned Yes   Pediatrician After Birth/ Follow Up Baby Visits [place]     Dating Summary    Working RICHARD: 22 set by Elke Oliveira RN on 22 based on Other Basis     Based On RICHARD GA Diff GA User Date   Other Basis 22 Working  Elke Oliveira RN 22     Prenatal Results    Prenatal Labs    Test Value Date Time   ABO/Rh * O positive  19    Antibody Scrn * Negative  22    Hgb      Hct      Platelets      Rubella * Immune  22    RPR      T. Pallidum Antibody * Non-reactive  22    Urine      Hep B Surf Ag * Negative  22    Hep C      HIV * Non-reactive  22    Gonorrhea * Negative  22    Chlamydia * Negative  22    TSH      GTT, 1 HR (Glucola)      GTT, Fasting      GTT, 1 HR      GTT, 2 HR      GTT, 3 HR        3rd Trimester    Test Value Date Time   Hgb      Hct      Platelets      Group B Strep * Negative  22    Antibody Scrn * Negative  22    TSH      T. Pallidum Antibody      Hep B Surf Ag      Gonorrhea      Chlamydia       Screening/Diagnostics    Test Value Date Time   AFP Only      AFP Tetra      Hgb Electrophoresis      Amniocentesis      Cystic Fibrosis      Thalessemia      Carrillo-Sachs      Canavan      PAP Smear      Beta HCG      NT      NIPT      COVID-19        Lab    Test Value Date Time   GTT, Fasting      GTT, 1HR      GTT, 2HR      GTT, 3HR      RPR * Non-Reactive  19    Beta HCG      CMV Ab      Toxoplasma Ab      Varicella Zoster Ab         Legend    *: Historical  View all results for this pregnancy     Clay Edmonds [[id number]]  [place] Delivery    Birth date/time: 2022 04:45:46  Delivery type: Vaginal, Spontaneous  Labor Event Times    Dilation complete date/time: 20220  Start pushing date/time: 2022  Labor Events     labor?: No   steroids?: None  Antibiotics during labor?: No  Rupture date/time: 2022  Rupture type: AROM  Fluid color: Clear  Fluid odor: None  Cervical ripening: Misoprostol  Induction: Prostaglandins  Induction date/time: 2022 170  Induction indications: Gestational Hypertension  Delivery Providers    Delivering clinician: Adelaida Landin CNM  Provider Role   Patrice Yang RN Primary Nurse   Russ Goss, RN Primary  Nurse   Adelaida Landin CNM Midwife   Lauri Sanchez, RN Nursery Nurse   Kennedy Huang Charge Nurse   Rojelio Thorpe [facility]     Anesthesia    Method: Epidural  Multiple Birth Onset Second Stage    Second Stage Start Date: 22 Second Stage Start Time: 430     Operative Delivery    Forceps attempted?: No  Vacuum extractor attempted?: No  Presentation    Presentation: Vertex  Position: Right Occiput Anterior  Apgars    Living status: Living  Apgars:  1 min. :  5 min.:  10 min. :  15 min.:  20 min.:    Skin color:  1  1       Heart rate:  2  2       Reflex irritability:  2  2       Muscle tone:  2  2       Respiratory effort:  2  2       Total:  9  9       Apgars assigned by: Karen Martin RN  Resuscitation    Method: Suctioning-bulb, Tactile Stimulation  Shoulder Dystocia    Shoulder dystocia present?: No  Measurements    Weight: 3135 g  Weight (lbs): 6 lb 14.6 oz  Length: 48.9 cm  Length (in): 19.25\"  Head circumference: 36 cm  Chest circumference: 32 cm  Abdominal girth: 31 cm  Lacerations    Episiotomy: None    Lacerations: None  Repair Needed: None  # of Repair Packets:   Suture Type and Size:   Suture Comment:   Estimated Blood Loss (mL):       Placenta    Placenta Date/Time: 2022  Removal: Spontaneous  Appearance: Normal, Intact Placenta disposition: Discarded     Vaginal Counts    Initial count personnel: FIDEL HERRERA; KELLY MARTIN  Final count personnel: Laurie Rivera KELLY MARTIN  Accurate final count?: Yes  Skin to Skin    Skin to skin initiated date/time: 2022 0447  Skin to skin with: Mother  Breastfeeding initiated date/time: 2022 0520  Delivery Summary History    Event User Date/Time   Signed Armando Aranda RN 2022 38:05:04       H&P Notes       H&P by Adelia Merlin, MD at 22 1614 documented on Admission (Discharged) from 2022 in [facility] 3 MOTHER INFANT    Author: Adelia Merlin, MD Author Type: Physician Filed: 22 [id number]   Note Status: Signed Cosign: Cosign Not Required Date of Service: 22   : Adelia Merlin, MD (Physician)               History & Physical     Name: Sandy Marie MRN: [id number]  SSN: [id number]    YOB: 1990  Age: 34 y.o. Sex: female       Subjective:      Estimated Date of Delivery: None noted. OB History    Para Term  AB Living   4 2 2   1 2   SAB IAB Ectopic Molar Multiple Live Births      1     0 1       # Outcome Date GA Lbr Michael/2nd Weight Sex Delivery Anes PTL Lv   4 Current                     3 Term 19 39w1d 11:41 / 00:22 3.94 kg F Vag-Spont EPI N YFN   2 Term                     1 IAB                           Ms. Jyotsna Hannah is admitted with pregnancy at 37 weeks and 1 day for induction of labor due to gestational HTN and BMI >40. Prenatal course was complicated by pregnancy induced hypertension and BMI 42 AT START OF CARE AND LATE ENTRY INTO PRENATAL CARE . Most recent pr eclamptic labs are normal  with ur pr/ Cr ration 0.12   Please see prenatal records for details. Past Medical History:   Diagnosis Date    Gestational hypertension 2022      No past surgical history on file.   Social History            Occupational History    Not on file   Tobacco Use    Smoking status: Never    Smokeless tobacco: Never   Substance and Sexual Activity    Alcohol use: No    Drug use: No    Sexual activity: Yes       Partners: Male       Birth control/protection: None      No family history on file. No Known Allergies          Prior to Admission medications    Medication Sig Start Date End Date Taking? Authorizing Provider   ibuprofen (MOTRIN) 800 mg tablet Take 1 Tab by mouth every eight (8) hours. 19     Josselyn Rowe MD   NIFEdipine ER (PROCARDIA XL) 30 mg ER tablet Take 1 Tab by mouth daily. 19     Josselyn Rowe MD   [id number] araseli-folic-dha (PRENATAL DHA+COMPLETE PRENATAL) -640 mg-mcg-mg cmpk Take 1 Cap by mouth. Indications: pregnancy       Provider, Historical         Review of Systems: A comprehensive review of systems was negative except for that written in the History of Present Illness. Objective:      Vitals: There were no vitals filed for this visit. Physical Exam:  Patient without distress. Abdomen: soft, nontender  Fundus: soft and non tender  Membranes:  Intact  Fetal Heart Rate: Reactive      CE in office - /-3        Prenatal Labs:         Lab Results   Component Value Date/Time     Rubella, External Immune 2019 12:00 AM     HBsAg, External Negative 2019 12:00 AM     HIV, External Non-Reactive 2019 12:00 AM     RPR, External Non-Reactive 2019 12:00 AM     Gonorrhea, External Negative 2019 12:00 AM     Chlamydia, External Negative 2019 12:00 AM     ABO,Rh O positive 2019 12:00 AM     GrBStrep, External Negative 2019 12:00 AM         Impression/Plan:      Active Problems:    Term pregnancy (2022)       Gestational hypertension (2022)       37 weeks gestation of pregnancy (2022)           Plan: Admit for induction of labor. Group B Strep negative.   Cyctotec 25 mcg q 2 hour po followed by CRB if needed   GBS negative   Admission labs as well ads HTN labs      Amaris Mahajan MD                       Patient Demographics    Patient Name   [facility]   [id number] Legal Sex   Female    1990 Address   [facility] [address] [address] Phone   [phone] (Home)   [phone] (Mobile)     CSN:   [id number]     Admit Date: Admit Time Room Bed   Aug 24, 2022  4:08  Gloria Morris 01 [[id number]]       Attending Providers    Provider Pager From To   Radha Michele MD  08/24/22 08/24/22   Kim Lai MD  08/24/22 08/27/22       Patient Contacts    Name Relation Home Work Mobile   [address] [place], [place] [id number] [phone]

## 2024-11-07 ENCOUNTER — TELEPHONE (OUTPATIENT)
Dept: PRIMARY CARE CLINIC | Facility: CLINIC | Age: 34
End: 2024-11-07

## 2024-11-07 NOTE — TELEPHONE ENCOUNTER
----- Message from Narayan HAYES sent at 11/7/2024  9:46 AM EST -----  Regarding: [facility] Appointment Request  [facility] Appointment Request    Patient needs appointment for [facility] Appointment Type: New Patient.    Patient Requested Dates(s): as soon as possible  Patient Requested Time:as soon as possible  Provider Name:Abimbola BennettDO    Reason for Appointment Request: New Patient - Requested Provider unavailable    Patient requesting to have appointment as new patient and annual check  --------------------------------------------------------------------------------------------------------------------------    Relationship to Patient: Self     Call Back Information: OK to leave message on voicemail  Preferred Call Back Number: Phone [phone]